# Patient Record
Sex: MALE | Race: WHITE | NOT HISPANIC OR LATINO | Employment: FULL TIME | URBAN - METROPOLITAN AREA
[De-identification: names, ages, dates, MRNs, and addresses within clinical notes are randomized per-mention and may not be internally consistent; named-entity substitution may affect disease eponyms.]

---

## 2020-10-29 ENCOUNTER — APPOINTMENT (OUTPATIENT)
Dept: WOMENS IMAGING | Facility: HOSPITAL | Age: 60
End: 2020-10-29

## 2020-10-29 ENCOUNTER — OFFICE VISIT (OUTPATIENT)
Dept: INTERNAL MEDICINE | Facility: CLINIC | Age: 60
End: 2020-10-29

## 2020-10-29 VITALS
HEIGHT: 60 IN | SYSTOLIC BLOOD PRESSURE: 134 MMHG | RESPIRATION RATE: 16 BRPM | BODY MASS INDEX: 32.08 KG/M2 | OXYGEN SATURATION: 96 % | DIASTOLIC BLOOD PRESSURE: 89 MMHG | WEIGHT: 163.4 LBS | HEART RATE: 76 BPM | TEMPERATURE: 97.9 F

## 2020-10-29 DIAGNOSIS — M25.512 CHRONIC LEFT SHOULDER PAIN: ICD-10-CM

## 2020-10-29 DIAGNOSIS — Z13.220 ENCOUNTER FOR LIPID SCREENING FOR CARDIOVASCULAR DISEASE: ICD-10-CM

## 2020-10-29 DIAGNOSIS — L98.9 LEG SKIN LESION, LEFT: ICD-10-CM

## 2020-10-29 DIAGNOSIS — G89.29 CHRONIC LEFT SHOULDER PAIN: ICD-10-CM

## 2020-10-29 DIAGNOSIS — Z13.6 ENCOUNTER FOR LIPID SCREENING FOR CARDIOVASCULAR DISEASE: ICD-10-CM

## 2020-10-29 DIAGNOSIS — Z11.59 ENCOUNTER FOR HCV SCREENING TEST FOR LOW RISK PATIENT: ICD-10-CM

## 2020-10-29 DIAGNOSIS — Z23 NEEDS FLU SHOT: ICD-10-CM

## 2020-10-29 DIAGNOSIS — K21.9 GASTROESOPHAGEAL REFLUX DISEASE WITHOUT ESOPHAGITIS: ICD-10-CM

## 2020-10-29 DIAGNOSIS — Z12.11 COLON CANCER SCREENING: ICD-10-CM

## 2020-10-29 DIAGNOSIS — Z76.89 ENCOUNTER TO ESTABLISH CARE: Primary | ICD-10-CM

## 2020-10-29 PROCEDURE — 90686 IIV4 VACC NO PRSV 0.5 ML IM: CPT | Performed by: NURSE PRACTITIONER

## 2020-10-29 PROCEDURE — 90471 IMMUNIZATION ADMIN: CPT | Performed by: NURSE PRACTITIONER

## 2020-10-29 PROCEDURE — 73030 X-RAY EXAM OF SHOULDER: CPT | Performed by: NURSE PRACTITIONER

## 2020-10-29 PROCEDURE — 73030 X-RAY EXAM OF SHOULDER: CPT | Performed by: RADIOLOGY

## 2020-10-29 PROCEDURE — 99203 OFFICE O/P NEW LOW 30 MIN: CPT | Performed by: NURSE PRACTITIONER

## 2020-10-29 RX ORDER — OMEPRAZOLE 20 MG/1
20 CAPSULE, DELAYED RELEASE ORAL DAILY
Qty: 30 CAPSULE | Refills: 5 | Status: SHIPPED | OUTPATIENT
Start: 2020-10-29 | End: 2022-10-31

## 2020-10-30 LAB
ALBUMIN SERPL-MCNC: 4.6 G/DL (ref 3.5–5.2)
ALBUMIN/GLOB SERPL: 1.8 G/DL
ALP SERPL-CCNC: 80 U/L (ref 39–117)
ALT SERPL-CCNC: 22 U/L (ref 1–41)
AST SERPL-CCNC: 19 U/L (ref 1–40)
BILIRUB SERPL-MCNC: 0.5 MG/DL (ref 0–1.2)
BUN SERPL-MCNC: 15 MG/DL (ref 8–23)
BUN/CREAT SERPL: 17.2 (ref 7–25)
CALCIUM SERPL-MCNC: 9.4 MG/DL (ref 8.6–10.5)
CHLORIDE SERPL-SCNC: 102 MMOL/L (ref 98–107)
CHOLEST SERPL-MCNC: 187 MG/DL (ref 0–200)
CO2 SERPL-SCNC: 22.9 MMOL/L (ref 22–29)
CREAT SERPL-MCNC: 0.87 MG/DL (ref 0.76–1.27)
ERYTHROCYTE [DISTWIDTH] IN BLOOD BY AUTOMATED COUNT: 12 % (ref 12.3–15.4)
GLOBULIN SER CALC-MCNC: 2.5 GM/DL
GLUCOSE SERPL-MCNC: 100 MG/DL (ref 65–99)
HCT VFR BLD AUTO: 47.9 % (ref 37.5–51)
HCV AB S/CO SERPL IA: <0.1 S/CO RATIO (ref 0–0.9)
HDLC SERPL-MCNC: 56 MG/DL (ref 40–60)
HGB BLD-MCNC: 16.1 G/DL (ref 13–17.7)
LABORATORY COMMENT REPORT: NORMAL
LDLC SERPL CALC-MCNC: 119 MG/DL (ref 0–100)
LDLC/HDLC SERPL: 2.11 {RATIO}
MCH RBC QN AUTO: 30.8 PG (ref 26.6–33)
MCHC RBC AUTO-ENTMCNC: 33.6 G/DL (ref 31.5–35.7)
MCV RBC AUTO: 91.8 FL (ref 79–97)
PLATELET # BLD AUTO: 336 10*3/MM3 (ref 140–450)
POTASSIUM SERPL-SCNC: 4.8 MMOL/L (ref 3.5–5.2)
PROT SERPL-MCNC: 7.1 G/DL (ref 6–8.5)
RBC # BLD AUTO: 5.22 10*6/MM3 (ref 4.14–5.8)
SODIUM SERPL-SCNC: 138 MMOL/L (ref 136–145)
TRIGL SERPL-MCNC: 65 MG/DL (ref 0–150)
VLDLC SERPL CALC-MCNC: 12 MG/DL (ref 5–40)
WBC # BLD AUTO: 6.42 10*3/MM3 (ref 3.4–10.8)

## 2020-11-02 DIAGNOSIS — M25.512 CHRONIC LEFT SHOULDER PAIN: Primary | ICD-10-CM

## 2020-11-02 DIAGNOSIS — G89.29 CHRONIC LEFT SHOULDER PAIN: Primary | ICD-10-CM

## 2020-11-03 NOTE — PROGRESS NOTES
Please call and notify him that his lab work has been reviewed and it looks relatively normal.  His left shoulder shows severe degenerative disease.  I will put a referral for him to go to Ortho for evaluation.     JEAN-PAUL

## 2020-11-16 ENCOUNTER — OFFICE VISIT (OUTPATIENT)
Dept: ORTHOPEDIC SURGERY | Facility: CLINIC | Age: 60
End: 2020-11-16

## 2020-11-16 VITALS — WEIGHT: 145 LBS | TEMPERATURE: 97.6 F | BODY MASS INDEX: 24.16 KG/M2 | HEIGHT: 65 IN

## 2020-11-16 DIAGNOSIS — M19.019 SHOULDER ARTHRITIS: ICD-10-CM

## 2020-11-16 DIAGNOSIS — R52 PAIN: Primary | ICD-10-CM

## 2020-11-16 PROCEDURE — 99204 OFFICE O/P NEW MOD 45 MIN: CPT | Performed by: ORTHOPAEDIC SURGERY

## 2020-11-16 PROCEDURE — 73030 X-RAY EXAM OF SHOULDER: CPT | Performed by: ORTHOPAEDIC SURGERY

## 2020-11-16 RX ORDER — ACETAMINOPHEN 325 MG/1
1000 TABLET ORAL ONCE
Status: CANCELLED | OUTPATIENT
Start: 2020-12-17 | End: 2020-11-16

## 2020-11-16 RX ORDER — PREGABALIN 75 MG/1
150 CAPSULE ORAL ONCE
Status: CANCELLED | OUTPATIENT
Start: 2020-12-17 | End: 2020-11-16

## 2020-11-16 RX ORDER — VANCOMYCIN HYDROCHLORIDE 1 G/200ML
15 INJECTION, SOLUTION INTRAVENOUS ONCE
Status: CANCELLED | OUTPATIENT
Start: 2020-12-17 | End: 2020-11-16

## 2020-11-16 RX ORDER — MELOXICAM 15 MG/1
15 TABLET ORAL ONCE
Status: CANCELLED | OUTPATIENT
Start: 2020-12-17 | End: 2020-11-16

## 2020-11-16 RX ORDER — CEFAZOLIN SODIUM 2 G/100ML
2 INJECTION, SOLUTION INTRAVENOUS ONCE
Status: CANCELLED | OUTPATIENT
Start: 2020-12-17 | End: 2020-11-16

## 2020-11-16 NOTE — PROGRESS NOTES
"Patient: Ashwin Anand    YOB: 1960    Medical Record Number: [id number]    Chief Complaints:  Left shoulder pain    History of Present Illness:     60 y.o. male patient who presents with a complaint of left shoulder pain.  He reports that the symptoms first started 10 to 15 years ago.  He has had a number of falls over the years, many of which occurred at work, but he does not recall a specific injury or inciting factor.  Pain is severe, constant and both aching and grinding.  No shooting pain down the arm, weakness or numbness.      He reports difficulty with routine daily activities at this point.  The symptoms have been getting progressively worse.  He denies any significant alleviating factors.  He has tried over-the-counter anti-inflammatories and has been modifying his activities as best as possible.  These have helped only modestly.  Again, he just feels like things are getting worse and this has become very debilitating for him.  He is left-hand dominant and works as an .  His job has become difficult for him due to his ongoing shoulder problems.    Allergies:   Allergies   Allergen Reactions   • Haldol [Haloperidol] Other (See Comments)     It makes me go \"schoo\"       Home Medications:    Current Outpatient Medications:   •  omeprazole (PrilOSEC) 20 MG capsule, Take 1 capsule by mouth Daily., Disp: 30 capsule, Rfl: 5    Past Medical History:   Diagnosis Date   • COPD (chronic obstructive pulmonary disease) (CMS/HCC)    • Schizophrenia, schizo-affective (CMS/HCC)        Past Surgical History:   Procedure Laterality Date   • APPENDECTOMY     • BACK SURGERY     • FINGER SURGERY     • KNEE SURGERY     • NECK SURGERY         Social History     Occupational History   • Not on file   Tobacco Use   • Smoking status: Former Smoker     Types: Cigarettes   • Smokeless tobacco: Never Used   • Tobacco comment: Feb 2008    Substance and Sexual Activity   • Alcohol use: Yes     Drinks " "per session: 1 or 2   • Drug use: Not on file   • Sexual activity: Not on file      Social History     Social History Narrative   • Not on file   As above.    Family History   Problem Relation Age of Onset   • Diabetes Mother    • Diabetes Father    • Prostate cancer Maternal Uncle        Review of Systems:      Constitutional: Denies fever, shaking or chills   Eyes: Denies change in visual acuity   HEENT: Denies nasal congestion or sore throat   Respiratory: Denies cough or shortness of breath   Cardiovascular: Denies chest pain or edema  Endocrine: Denies tremors, palpitations, intolerance of heat or cold, polyuria, polydipsia.  GI: Denies abdominal pain, nausea, vomiting, bloody stools or diarrhea  : Denies frequency, urgency, incontinence, retention, or nocturia.  Musculoskeletal: Denies numbness, tingling or loss of motor function   Integument: Denies rash, lesion or ulceration   Neurologic: Denies headache or focal weakness, deficits  Heme: Denies spontaneous or excessive bleeding, epistaxis, hematuria, melena, fatigue, enlarged or tender lymph nodes.      All other pertinent positives and negatives as noted above in HPI.    Physical Exam:   60 y.o. male  Vitals:    11/16/20 0915   Temp: 97.6 °F (36.4 °C)   TempSrc: Temporal   Weight: 65.8 kg (145 lb)   Height: 165.1 cm (65\")       General:  Patient is awake and alert.  Appears in no acute distress or discomfort.    Psych:  Affect and demeanor are appropriate.    Eyes:  Conjunctiva and sclera appear grossly normal.  Eyes track well and EOM seem to be intact.    Ears:  No gross abnormalities.  Hearing adequate for the exam.    Cardiovascular:  Regular rate and rhythm.    Lungs:  Good chest expansion.  Breathing unlabored.    Spine:  Neck appears grossly normal.  No palpable masses or adenopathy.  Good motion.  Spurling's maneuver is negative for any shoulder or arm symptoms.    Extremities:  Right shoulder is examined.  Skin is benign.  No obvious gross " abnormalities.  No palpable masses or adenopathy.  Moderate tenderness noted over anterior glenohumeral joint and rotator interval.  Motion is to roughly 50 degrees of forward elevation, degrees of external rotation, lacks at least 20 degrees of horizontal abduction, side pocket internal rotation.  He has audible and palpable crepitus with range of motion.  No instability.  Rotator cuff strength seems to be well preserved but the exam is limited due to discomfort with resisted active motion.  His deltoid will fire but I could not really get a good assessment of his strength.  Axillary nerve sensation is intact.  Good motor and sensory function in his lower arm and hand.  Palpable radial pulse.  Brisk capillary refill.         Radiology:  AP, scapular Y, and axillary views of the left shoulder are ordered by myself and reviewed to evaluate the patient's complaint.  No comparison films are immediately available.  The x-rays show end-stage glenohumeral osteoarthritis with bone-on-bone degeneration, severe glenoid wear, osteophyte formation, and subchondral sclerosis.  The acromiohumeral interval measures normal.    Assessment/Plan:  Left shoulder end-stage osteoarthritis    We discussed treatment options in detail including conservative treatment versus surgical options.  Regarding conservative treatment, we discussed appropriate activity modifications, anti-inflammatories, injections, and physical therapy.  We also discussed the option of an arthroplasty and all that would entail.  He would like to move forward with the replacement.  Despite never really having tried any formal conservative therapy, I consider that his arthrosis is severe enough to warrant an arthroplasty at this point.  He would like to move forward with that.  The risk, benefits and alternatives were thoroughly discussed.  We are going to look at getting this scheduled for him.  He will follow-up with us for a preoperative visit with either myself  or Kirsten.    Ashutosh Mehta MD    11/16/2020    CC to Sophie Rascon III, NP-C

## 2020-12-04 PROBLEM — M19.019 SHOULDER ARTHRITIS: Status: ACTIVE | Noted: 2020-12-04

## 2020-12-14 ENCOUNTER — OFFICE VISIT (OUTPATIENT)
Dept: ORTHOPEDIC SURGERY | Facility: CLINIC | Age: 60
End: 2020-12-14

## 2020-12-14 VITALS — WEIGHT: 161 LBS | BODY MASS INDEX: 26.82 KG/M2 | HEIGHT: 65 IN | TEMPERATURE: 96 F

## 2020-12-14 DIAGNOSIS — Z01.818 PREOP EXAMINATION: Primary | ICD-10-CM

## 2020-12-14 PROCEDURE — 99214 OFFICE O/P EST MOD 30 MIN: CPT | Performed by: NURSE PRACTITIONER

## 2020-12-14 NOTE — H&P (VIEW-ONLY)
"   History & Physical         Patient: Ashwin Anand    YOB: 1960    Medical Record Number: [id number]    Chief Complaints:   Chief Complaint   Patient presents with   • Left Shoulder - Pre-op Exam       History of Present Illness: 60 y.o. male comes in today of upcoming shoulder replacement surgery. Reports a long history of progressively worsening pain, motion loss, and dysfunction.  Describes current pain as severe.  Has failed prolonged conservative treatment.  Denies any new complaints or issues.    Allergies:   Allergies   Allergen Reactions   • Haldol [Haloperidol] Other (See Comments)     It makes me go \"schoo\"       Medications:   Home Medications:    Current Outpatient Medications:   •  omeprazole (PrilOSEC) 20 MG capsule, Take 1 capsule by mouth Daily., Disp: 30 capsule, Rfl: 5    Past Medical History:   Diagnosis Date   • COPD (chronic obstructive pulmonary disease) (CMS/HCC)    • Schizophrenia, schizo-affective (CMS/HCC)           Past Surgical History:   Procedure Laterality Date   • APPENDECTOMY     • BACK SURGERY     • FINGER SURGERY     • KNEE SURGERY     • NECK SURGERY            Social History     Occupational History   • Not on file   Tobacco Use   • Smoking status: Former Smoker     Types: Cigarettes   • Smokeless tobacco: Never Used   • Tobacco comment: Feb 2008    Substance and Sexual Activity   • Alcohol use: Yes     Drinks per session: 1 or 2   • Drug use: Not on file   • Sexual activity: Not on file      Social History     Social History Narrative   • Not on file          Family History   Problem Relation Age of Onset   • Diabetes Mother    • Diabetes Father    • Prostate cancer Maternal Uncle        Review of Systems:     Constitutional:  Denies fever, shaking or chills   Eyes:  Denies change in visual acuity   HEENT:  Denies nasal congestion or sore throat   Respiratory:  Denies cough or shortness of breath   Cardiovascular:  Denies chest pain or edema   GI:  Denies abdominal " "pain, nausea, vomiting, bloody stools or diarrhea   Musculoskeletal:  Denies numbness tingling or loss of motor function except as outlined above in history of present illness.  Integument:  Denies rash, lesion or ulceration   Neurologic:  Denies headache or focal weakness, deficits      All other pertinent positives and negatives as noted above in HPI.    Physical Exam: 60 y.o. male    Vitals:    12/14/20 1005   Temp: 96 °F (35.6 °C)   TempSrc: Temporal   Weight: 73 kg (161 lb)   Height: 165.1 cm (65\")     General:  Patient is awake and alert.  Appears in no acute distress or discomfort.    Psych:  Affect and demeanor are appropriate.    Eyes:  Conjunctiva and sclera appear grossly normal.  Eyes track well and EOM seem to be intact.    Ears:  No gross abnormalities.  Hearing adequate for the exam.    Cardiovascular:  Regular rate and rhythm.    Lungs:  Good chest expansion.  Breathing unlabored.    Lymph:  No palpable adenopathy about neck or axilla.    Neck:  Supple.  Normal ROM.  Negative Spurling's for shoulder or arm pain.    Left upper extremity:  Skin benign and intact without evidence for swelling, masses or atrophy.  No palpable masses.  Moderate anterior tenderness.  Shoulder ROM limited and uncomfortable--45° FE, 35° ER, to back pocket IR.  No evident instability.  He has audible and palpable crepitus with range of motion.  Unable to assess weakness due to discomfort.  He can briefly maintain his arm in forward elevation when I raise it for him.  Good strength in the lower arm and hand.  Intact sensation throughout the arm and hand palpable radial pulse with brisk cap refill.    Diagnostic Tests:  Lab Results   Component Value Date    CALCIUM 9.4 10/29/2020     10/29/2020    K 4.8 10/29/2020    CO2 22.9 10/29/2020     10/29/2020    BUN 15 10/29/2020    CREATININE 0.87 10/29/2020    EGFRIFAFRI 108 10/29/2020    EGFRIFNONA 89 10/29/2020    BCR 17.2 10/29/2020     Lab Results   Component Value " Date    WBC 6.42 10/29/2020    HGB 16.1 10/29/2020    HCT 47.9 10/29/2020    MCV 91.8 10/29/2020     10/29/2020     No results found for: INR, PROTIME    Imaging:  Previous x-rays of the shoulder are reviewed.  The x-rays show end-stage glenohumeral osteoarthritis with bone-on-bone degeneration, severe glenoid wear, osteophyte formation, and subchondral sclerosis.  The acromiohumeral interval measures normal.    Assessment:  Left shoulder end-stage osteoarthritis    Plan: We had a thorough discussion regarding the risks, benefits and alternatives to an arthroplasty and non-surgical management versus surgery.  I explained that surgical risks include infection, hematoma, hardware related complications including failure of fixation, loosening, fracture, persistent pain and/or loss of motion, iatrogenic nerve and/or blood vessel injury resulting in permanent weakness, numbness or dysfunction, DVT, PE, positioning related neuropraxia, and anesthesia related complications resulting in death.  We discussed the indication for a reverse as opposed to a standard total shoulder and the risks inherent to the reverse including notching, glenoid loosening, instability, and traction related neuropraxia, any of which could result in persistent pain or problems requiring further surgery.  Lastly, we discussed the rehab and all that will be expected of the patient post operatively to ensure an optimal outcome.  The patient voiced understanding of the risks, benefits, and alternative forms of treatment that were discussed and the patient consents to proceed with the reverse arthroplasty.      Kirsten Perez, MAILE  12/14/20

## 2020-12-14 NOTE — PROGRESS NOTES
"   History & Physical         Patient: Ashwin Anand    YOB: 1960    Medical Record Number: [id number]    Chief Complaints:   Chief Complaint   Patient presents with   • Left Shoulder - Pre-op Exam       History of Present Illness: 60 y.o. male comes in today of upcoming shoulder replacement surgery. Reports a long history of progressively worsening pain, motion loss, and dysfunction.  Describes current pain as severe.  Has failed prolonged conservative treatment.  Denies any new complaints or issues.    Allergies:   Allergies   Allergen Reactions   • Haldol [Haloperidol] Other (See Comments)     It makes me go \"schoo\"       Medications:   Home Medications:    Current Outpatient Medications:   •  omeprazole (PrilOSEC) 20 MG capsule, Take 1 capsule by mouth Daily., Disp: 30 capsule, Rfl: 5    Past Medical History:   Diagnosis Date   • COPD (chronic obstructive pulmonary disease) (CMS/HCC)    • Schizophrenia, schizo-affective (CMS/HCC)           Past Surgical History:   Procedure Laterality Date   • APPENDECTOMY     • BACK SURGERY     • FINGER SURGERY     • KNEE SURGERY     • NECK SURGERY            Social History     Occupational History   • Not on file   Tobacco Use   • Smoking status: Former Smoker     Types: Cigarettes   • Smokeless tobacco: Never Used   • Tobacco comment: Feb 2008    Substance and Sexual Activity   • Alcohol use: Yes     Drinks per session: 1 or 2   • Drug use: Not on file   • Sexual activity: Not on file      Social History     Social History Narrative   • Not on file          Family History   Problem Relation Age of Onset   • Diabetes Mother    • Diabetes Father    • Prostate cancer Maternal Uncle        Review of Systems:     Constitutional:  Denies fever, shaking or chills   Eyes:  Denies change in visual acuity   HEENT:  Denies nasal congestion or sore throat   Respiratory:  Denies cough or shortness of breath   Cardiovascular:  Denies chest pain or edema   GI:  Denies abdominal " "pain, nausea, vomiting, bloody stools or diarrhea   Musculoskeletal:  Denies numbness tingling or loss of motor function except as outlined above in history of present illness.  Integument:  Denies rash, lesion or ulceration   Neurologic:  Denies headache or focal weakness, deficits      All other pertinent positives and negatives as noted above in HPI.    Physical Exam: 60 y.o. male    Vitals:    12/14/20 1005   Temp: 96 °F (35.6 °C)   TempSrc: Temporal   Weight: 73 kg (161 lb)   Height: 165.1 cm (65\")     General:  Patient is awake and alert.  Appears in no acute distress or discomfort.    Psych:  Affect and demeanor are appropriate.    Eyes:  Conjunctiva and sclera appear grossly normal.  Eyes track well and EOM seem to be intact.    Ears:  No gross abnormalities.  Hearing adequate for the exam.    Cardiovascular:  Regular rate and rhythm.    Lungs:  Good chest expansion.  Breathing unlabored.    Lymph:  No palpable adenopathy about neck or axilla.    Neck:  Supple.  Normal ROM.  Negative Spurling's for shoulder or arm pain.    Left upper extremity:  Skin benign and intact without evidence for swelling, masses or atrophy.  No palpable masses.  Moderate anterior tenderness.  Shoulder ROM limited and uncomfortable--45° FE, 35° ER, to back pocket IR.  No evident instability.  He has audible and palpable crepitus with range of motion.  Unable to assess weakness due to discomfort.  He can briefly maintain his arm in forward elevation when I raise it for him.  Good strength in the lower arm and hand.  Intact sensation throughout the arm and hand palpable radial pulse with brisk cap refill.    Diagnostic Tests:  Lab Results   Component Value Date    CALCIUM 9.4 10/29/2020     10/29/2020    K 4.8 10/29/2020    CO2 22.9 10/29/2020     10/29/2020    BUN 15 10/29/2020    CREATININE 0.87 10/29/2020    EGFRIFAFRI 108 10/29/2020    EGFRIFNONA 89 10/29/2020    BCR 17.2 10/29/2020     Lab Results   Component Value " Date    WBC 6.42 10/29/2020    HGB 16.1 10/29/2020    HCT 47.9 10/29/2020    MCV 91.8 10/29/2020     10/29/2020     No results found for: INR, PROTIME    Imaging:  Previous x-rays of the shoulder are reviewed.  The x-rays show end-stage glenohumeral osteoarthritis with bone-on-bone degeneration, severe glenoid wear, osteophyte formation, and subchondral sclerosis.  The acromiohumeral interval measures normal.    Assessment:  Left shoulder end-stage osteoarthritis    Plan: We had a thorough discussion regarding the risks, benefits and alternatives to an arthroplasty and non-surgical management versus surgery.  I explained that surgical risks include infection, hematoma, hardware related complications including failure of fixation, loosening, fracture, persistent pain and/or loss of motion, iatrogenic nerve and/or blood vessel injury resulting in permanent weakness, numbness or dysfunction, DVT, PE, positioning related neuropraxia, and anesthesia related complications resulting in death.  We discussed the indication for a reverse as opposed to a standard total shoulder and the risks inherent to the reverse including notching, glenoid loosening, instability, and traction related neuropraxia, any of which could result in persistent pain or problems requiring further surgery.  Lastly, we discussed the rehab and all that will be expected of the patient post operatively to ensure an optimal outcome.  The patient voiced understanding of the risks, benefits, and alternative forms of treatment that were discussed and the patient consents to proceed with the reverse arthroplasty.      Kirsten Perez, MAILE  12/14/20

## 2020-12-15 ENCOUNTER — APPOINTMENT (OUTPATIENT)
Dept: PREADMISSION TESTING | Facility: HOSPITAL | Age: 60
End: 2020-12-15

## 2020-12-15 VITALS
HEART RATE: 91 BPM | DIASTOLIC BLOOD PRESSURE: 87 MMHG | TEMPERATURE: 97.9 F | HEIGHT: 65 IN | BODY MASS INDEX: 25.83 KG/M2 | RESPIRATION RATE: 18 BRPM | OXYGEN SATURATION: 98 % | WEIGHT: 155 LBS | SYSTOLIC BLOOD PRESSURE: 147 MMHG

## 2020-12-15 DIAGNOSIS — M19.019 SHOULDER ARTHRITIS: ICD-10-CM

## 2020-12-15 LAB
ANION GAP SERPL CALCULATED.3IONS-SCNC: 11.8 MMOL/L (ref 5–15)
BUN SERPL-MCNC: 18 MG/DL (ref 8–23)
BUN/CREAT SERPL: 20.2 (ref 7–25)
CALCIUM SPEC-SCNC: 9.1 MG/DL (ref 8.6–10.5)
CHLORIDE SERPL-SCNC: 102 MMOL/L (ref 98–107)
CO2 SERPL-SCNC: 24.2 MMOL/L (ref 22–29)
CREAT SERPL-MCNC: 0.89 MG/DL (ref 0.76–1.27)
DEPRECATED RDW RBC AUTO: 38.9 FL (ref 37–54)
ERYTHROCYTE [DISTWIDTH] IN BLOOD BY AUTOMATED COUNT: 12 % (ref 12.3–15.4)
GFR SERPL CREATININE-BSD FRML MDRD: 87 ML/MIN/1.73
GLUCOSE SERPL-MCNC: 91 MG/DL (ref 65–99)
HCT VFR BLD AUTO: 44.4 % (ref 37.5–51)
HGB BLD-MCNC: 15.5 G/DL (ref 13–17.7)
MCH RBC QN AUTO: 31.3 PG (ref 26.6–33)
MCHC RBC AUTO-ENTMCNC: 34.9 G/DL (ref 31.5–35.7)
MCV RBC AUTO: 89.5 FL (ref 79–97)
PLATELET # BLD AUTO: 338 10*3/MM3 (ref 140–450)
PMV BLD AUTO: 9.4 FL (ref 6–12)
POTASSIUM SERPL-SCNC: 3.9 MMOL/L (ref 3.5–5.2)
QT INTERVAL: 361 MS
RBC # BLD AUTO: 4.96 10*6/MM3 (ref 4.14–5.8)
SODIUM SERPL-SCNC: 138 MMOL/L (ref 136–145)
WBC # BLD AUTO: 8.8 10*3/MM3 (ref 3.4–10.8)

## 2020-12-15 PROCEDURE — 93010 ELECTROCARDIOGRAM REPORT: CPT | Performed by: INTERNAL MEDICINE

## 2020-12-15 PROCEDURE — 36415 COLL VENOUS BLD VENIPUNCTURE: CPT

## 2020-12-15 PROCEDURE — 85027 COMPLETE CBC AUTOMATED: CPT

## 2020-12-15 PROCEDURE — 80048 BASIC METABOLIC PNL TOTAL CA: CPT

## 2020-12-15 PROCEDURE — U0004 COV-19 TEST NON-CDC HGH THRU: HCPCS | Performed by: NURSE PRACTITIONER

## 2020-12-15 PROCEDURE — C9803 HOPD COVID-19 SPEC COLLECT: HCPCS | Performed by: NURSE PRACTITIONER

## 2020-12-15 PROCEDURE — 93005 ELECTROCARDIOGRAM TRACING: CPT

## 2020-12-15 NOTE — DISCHARGE INSTRUCTIONS
Take the following medications the morning of surgery:  OMEPRAZOLE    If you are on prescription narcotic pain medication to control your pain you may also take that medication the morning of surgery.    General Instructions:  • Do not eat solid food after midnight the night before surgery.  • You may drink clear liquids day of surgery but must stop at least one hour before your hospital arrival time.  • It is beneficial for you to have a clear drink that contains carbohydrates the day of surgery.  We suggest a 12 to 20 ounce bottle of Gatorade or Powerade for non-diabetic patients or a 12 to 20 ounce bottle of G2 or Powerade Zero for diabetic patients. (Pediatric patients, are not advised to drink a 12 to 20 ounce carbohydrate drink)    Clear liquids are liquids you can see through.  Nothing red in color.     Plain water                               Sports drinks  Sodas                                   Gelatin (Jell-O)  Fruit juices without pulp such as white grape juice and apple juice  Popsicles that contain no fruit or yogurt  Tea or coffee (no cream or milk added)  Gatorade / Powerade  G2 / Powerade Zero    • Infants may have breast milk up to four hours before surgery.  • Infants drinking formula may drink formula up to six hours before surgery.   • Patients who avoid smoking, chewing tobacco and alcohol for 4 weeks prior to surgery have a reduced risk of post-operative complications.  Quit smoking as many days before surgery as you can.  • Do not smoke, use chewing tobacco or drink alcohol the day of surgery.   • If applicable bring your C-PAP/ BI-PAP machine.  • Bring any papers given to you in the doctor’s office.  • Wear clean comfortable clothes.  • Do not wear contact lenses, false eyelashes or make-up.  Bring a case for your glasses.   • Bring crutches or walker if applicable.  • Remove all piercings.  Leave jewelry and any other valuables at home.  • Hair extensions with metal clips must be removed  prior to surgery.  • The Pre-Admission Testing nurse will instruct you to bring medications if unable to obtain an accurate list in Pre-Admission Testing.        If you were given a blood bank ID arm band remember to bring it with you the day of surgery.    Preventing a Surgical Site Infection:  • For 2 to 3 days before surgery, avoid shaving with a razor because the razor can irritate skin and make it easier to develop an infection.    • Any areas of open skin can increase the risk of a post-operative wound infection by allowing bacteria to enter and travel throughout the body.  Notify your surgeon if you have any skin wounds / rashes even if it is not near the expected surgical site.  The area will need assessed to determine if surgery should be delayed until it is healed.  • The night prior to surgery shower using a fresh bar of anti-bacterial soap (such as Dial) and clean washcloth.  Sleep in a clean bed with clean clothing.  Do not allow pets to sleep with you.  • Shower on the morning of surgery using a fresh bar of anti-bacterial soap (such as Dial) and clean washcloth.  Dry with a clean towel and dress in clean clothing.  • Ask your surgeon if you will be receiving antibiotics prior to surgery.  • Make sure you, your family, and all healthcare providers clean their hands with soap and water or an alcohol based hand  before caring for you or your wound.    Day of surgery: 12/17/2020 PT IS GOING TO CALL DR BORAJ OFFICE AND CONFIRM ARRIVAL TIME  Your arrival time is approximately two hours before your scheduled surgery time.  Upon arrival, a Pre-op nurse and Anesthesiologist will review your health history, obtain vital signs, and answer questions you may have.  The only belongings needed at this time will be a list of your home medications and if applicable your C-PAP/BI-PAP machine.  A Pre-op nurse will start an IV and you may receive medication in preparation for surgery, including something to  help you relax.     Please be aware that surgery does come with discomfort.  We want to make every effort to control your discomfort so please discuss any uncontrolled symptoms with your nurse.   Your doctor will most likely have prescribed pain medications.      If you are going home after surgery you will receive individualized written care instructions before being discharged.  A responsible adult must drive you to and from the hospital on the day of your surgery and stay with you for 24 hours.    If you are staying overnight following surgery, you will be transported to your hospital room following the recovery period.  [facility] has [facility] rooms.    If you have any questions please call Pre-Admission Testing at [phone].  Deductibles and co-payments are collected on the day of service. Please be prepared to pay the required co-pay, deductible or deposit on the day of service as defined by your plan.    Patient Education for Self-Quarantine Process    Following your COVID testing, we strongly recommend that you do not leave your home after you have been tested for COVID except to get medical care. This includes not going to work, school or to public areas.  If this is not possible for you to do please limit your activities to only required outings.  Be sure to wear a mask when you are with other people, practice social distancing and wash your hands frequently.      The following items provide additional details to keep you safe.  • Wash your hands with soap and water frequently for at least 20 seconds.   • Avoid touching your eyes, nose and mouth with unwashed hands.  • Do not share anything - utensils, towels, food from the same bowl.   • Have your own utensils, drinking glass, dishes, towels and bedding.   • Do not have visitors.   • Do use FaceTime to stay in touch with family and friends.  • You should stay in a specific room away from others if possible.   • Stay at least 6 feet  away from others in the home if you cannot have a dedicated room to yourself.   • Do not snuggle with your pet. While the CDC says there is no evidence that pets can spread COVID-19 or be infected from humans, it is probably best to avoid “petting, snuggling, being kissed or licked and sharing food (during self-quarantine)”, according to the CDC.   • Sanitize household surfaces daily. Include all high touch areas (door handles, light switches, phones, countertops, etc.)  • Do not share a bathroom with others, if possible.   • Wear a mask around others in your home if you are unable to stay in a separate room or 6 feet apart. If  you are unable to wear a mask, have your family member wear a mask if they must be within 6 feet of you.   Call your surgeon immediately if you experience any of the following symptoms:  • Sore Throat  • Shortness of Breath or difficulty breathing  • Cough  • Chills  • Body soreness or muscle pain  • Headache  • Fever  • New loss of taste or smell  • Do not arrive for your surgery ill.  Your procedure will need to be rescheduled to another time.  You will need to call your physician before the day of surgery to avoid any unnecessary exposure to hospital staff as well as other patients.    CHLORHEXIDINE CLOTH INSTRUCTIONS  The morning of surgery follow these instructions using the Chlorhexidine cloths you've been given.  These steps reduce bacteria on the body.  Do not use the cloths near your eyes, ears mouth, genitalia or on open wounds.  Throw the cloths away after use but do not try to flush them down a toilet.      • Open and remove one cloth at a time from the package.    • Leave the cloth unfolded and begin the bathing.  • Massage the skin with the cloths using gentle pressure to remove bacteria.  Do not scrub harshly.   • Follow the steps below with one 2% CHG cloth per area (6 total cloths).  • One cloth for neck, shoulders and chest.  • One cloth for both arms, hands, fingers and  underarms (do underarms last).  • One cloth for the abdomen followed by groin.  • One cloth for right leg and foot including between the toes.  • One cloth for left leg and foot including between the toes.  • The last cloth is to be used for the back of the neck, back and buttocks.    Allow the CHG to air dry 3 minutes on the skin which will give it time to work and decrease the chance of irritation.  The skin may feel sticky until it is dry.  Do not rinse with water or any other liquid or you will lose the beneficial effects of the CHG.  If mild skin irritation occurs, do rinse the skin to remove the CHG.  Report this to the nurse at time of admission.  Do not apply lotions, creams, ointments, deodorants or perfumes after using the clothes. Dress in clean clothes before coming to the hospital.    BACTROBAN NASAL OINTMENT  There are many germs normally in your nose. Bactroban is an ointment that will help reduce these germs. Please follow these instructions for Bactroban use:      ____The day before surgery in the morning  Date________    ____The day before surgery in the evening              Date________    ____The day of surgery in the morning    Date________    **Squirt ½ package of Bactroban Ointment onto a cotton applicator and apply to inside of 1st nostril.  Squirt the remaining Bactroban and apply to the inside of the other nostril.

## 2020-12-16 LAB — SARS-COV-2 RNA RESP QL NAA+PROBE: NOT DETECTED

## 2020-12-17 ENCOUNTER — HOSPITAL ENCOUNTER (INPATIENT)
Facility: HOSPITAL | Age: 60
LOS: 1 days | Discharge: HOME | End: 2020-12-18
Attending: ORTHOPAEDIC SURGERY | Admitting: ORTHOPAEDIC SURGERY

## 2020-12-17 ENCOUNTER — ANESTHESIA (OUTPATIENT)
Dept: PERIOP | Facility: HOSPITAL | Age: 60
End: 2020-12-17

## 2020-12-17 ENCOUNTER — APPOINTMENT (OUTPATIENT)
Dept: GENERAL RADIOLOGY | Facility: HOSPITAL | Age: 60
End: 2020-12-17

## 2020-12-17 ENCOUNTER — ANESTHESIA EVENT (OUTPATIENT)
Dept: PERIOP | Facility: HOSPITAL | Age: 60
End: 2020-12-17

## 2020-12-17 DIAGNOSIS — M19.019 SHOULDER ARTHRITIS: ICD-10-CM

## 2020-12-17 DIAGNOSIS — Z96.612 S/P REVERSE TOTAL SHOULDER ARTHROPLASTY, LEFT: Primary | ICD-10-CM

## 2020-12-17 PROCEDURE — 25010000002 NEOSTIGMINE PER 0.5 MG: Performed by: NURSE ANESTHETIST, CERTIFIED REGISTERED

## 2020-12-17 PROCEDURE — 25010000002 DEXAMETHASONE PER 1 MG: Performed by: NURSE ANESTHETIST, CERTIFIED REGISTERED

## 2020-12-17 PROCEDURE — 76942 ECHO GUIDE FOR BIOPSY: CPT | Performed by: ORTHOPAEDIC SURGERY

## 2020-12-17 PROCEDURE — C1776 JOINT DEVICE (IMPLANTABLE): HCPCS | Performed by: ORTHOPAEDIC SURGERY

## 2020-12-17 PROCEDURE — C1713 ANCHOR/SCREW BN/BN,TIS/BN: HCPCS | Performed by: ORTHOPAEDIC SURGERY

## 2020-12-17 PROCEDURE — 73020 X-RAY EXAM OF SHOULDER: CPT

## 2020-12-17 PROCEDURE — 25010000002 PROPOFOL 10 MG/ML EMULSION: Performed by: NURSE ANESTHETIST, CERTIFIED REGISTERED

## 2020-12-17 PROCEDURE — 25010000002 HYDRALAZINE PER 20 MG: Performed by: NURSE ANESTHETIST, CERTIFIED REGISTERED

## 2020-12-17 PROCEDURE — 23472 RECONSTRUCT SHOULDER JOINT: CPT | Performed by: ORTHOPAEDIC SURGERY

## 2020-12-17 PROCEDURE — 25010000002 MIDAZOLAM PER 1 MG: Performed by: ANESTHESIOLOGY

## 2020-12-17 PROCEDURE — 25010000003 CEFAZOLIN IN DEXTROSE 2-4 GM/100ML-% SOLUTION: Performed by: ORTHOPAEDIC SURGERY

## 2020-12-17 PROCEDURE — 25010000002 ROPIVACAINE PER 1 MG: Performed by: ANESTHESIOLOGY

## 2020-12-17 PROCEDURE — 0RRK00Z REPLACEMENT OF LEFT SHOULDER JOINT WITH REVERSE BALL AND SOCKET SYNTHETIC SUBSTITUTE, OPEN APPROACH: ICD-10-PCS | Performed by: ORTHOPAEDIC SURGERY

## 2020-12-17 PROCEDURE — 25010000002 ONDANSETRON PER 1 MG: Performed by: NURSE ANESTHETIST, CERTIFIED REGISTERED

## 2020-12-17 PROCEDURE — 25010000002 FENTANYL CITRATE (PF) 100 MCG/2ML SOLUTION: Performed by: ANESTHESIOLOGY

## 2020-12-17 PROCEDURE — 25010000002 VANCOMYCIN PER 500 MG: Performed by: ORTHOPAEDIC SURGERY

## 2020-12-17 DEVICE — COMP GLEN VERSA/DIAL PLS3 40MM: Type: IMPLANTABLE DEVICE | Site: SHOULDER | Status: FUNCTIONAL

## 2020-12-17 DEVICE — BLUE CO-BRAID POLYETHYLENE SIZE 2 38" C-7 NEEDLE BIOMET
Type: IMPLANTABLE DEVICE | Site: SHOULDER | Status: FUNCTIONAL
Brand: MAXBRAID PE

## 2020-12-17 DEVICE — SCRW FIX LK HEX 4.75X25MM: Type: IMPLANTABLE DEVICE | Site: SHOULDER | Status: FUNCTIONAL

## 2020-12-17 DEVICE — BASEPLT AUG W/MINI TPR ADAPT LG: Type: IMPLANTABLE DEVICE | Site: SHOULDER | Status: FUNCTIONAL

## 2020-12-17 DEVICE — KNOTLESS TISSUE CONTROL DEVICE, UNDYED UNIDIRECTIONAL (ANTIBACTERIAL) SYNTHETIC ABSORBABLE DEVICE
Type: IMPLANTABLE DEVICE | Site: SHOULDER | Status: FUNCTIONAL
Brand: STRATAFIX

## 2020-12-17 DEVICE — TRY HUM/SHLDR COMPREHENSIVE/REVERSE MINI COCR STD 40MM: Type: IMPLANTABLE DEVICE | Site: SHOULDER | Status: FUNCTIONAL

## 2020-12-17 DEVICE — BEAR HUM PROLNG STD 40MM: Type: IMPLANTABLE DEVICE | Site: SHOULDER | Status: FUNCTIONAL

## 2020-12-17 DEVICE — STEM HUM/SHLDR COMPREHENSIVE MINI 12X83MM: Type: IMPLANTABLE DEVICE | Site: SHOULDER | Status: FUNCTIONAL

## 2020-12-17 DEVICE — SCRW COMPRNSV CNTRL 6.5X25MM REUS: Type: IMPLANTABLE DEVICE | Site: SHOULDER | Status: FUNCTIONAL

## 2020-12-17 DEVICE — SCRW FIX LK HEX 4.75X20MM: Type: IMPLANTABLE DEVICE | Site: SHOULDER | Status: FUNCTIONAL

## 2020-12-17 DEVICE — SCRW FIX LK HEX 4.75X15MM: Type: IMPLANTABLE DEVICE | Site: SHOULDER | Status: FUNCTIONAL

## 2020-12-17 DEVICE — TOTL SHLDER REV: Type: IMPLANTABLE DEVICE | Site: SHOULDER | Status: FUNCTIONAL

## 2020-12-17 RX ORDER — HYDROCODONE BITARTRATE AND ACETAMINOPHEN 7.5; 325 MG/1; MG/1
1 TABLET ORAL ONCE AS NEEDED
Status: DISCONTINUED | OUTPATIENT
Start: 2020-12-17 | End: 2020-12-17 | Stop reason: HOSPADM

## 2020-12-17 RX ORDER — SODIUM CHLORIDE 0.9 % (FLUSH) 0.9 %
10 SYRINGE (ML) INJECTION EVERY 12 HOURS SCHEDULED
Status: DISCONTINUED | OUTPATIENT
Start: 2020-12-17 | End: 2020-12-17 | Stop reason: HOSPADM

## 2020-12-17 RX ORDER — VANCOMYCIN HYDROCHLORIDE 1 G/200ML
15 INJECTION, SOLUTION INTRAVENOUS ONCE
Status: COMPLETED | OUTPATIENT
Start: 2020-12-17 | End: 2020-12-17

## 2020-12-17 RX ORDER — PROPOFOL 10 MG/ML
VIAL (ML) INTRAVENOUS AS NEEDED
Status: DISCONTINUED | OUTPATIENT
Start: 2020-12-17 | End: 2020-12-17 | Stop reason: SURG

## 2020-12-17 RX ORDER — CEFAZOLIN SODIUM 2 G/100ML
2 INJECTION, SOLUTION INTRAVENOUS ONCE
Status: COMPLETED | OUTPATIENT
Start: 2020-12-17 | End: 2020-12-17

## 2020-12-17 RX ORDER — MELOXICAM 15 MG/1
15 TABLET ORAL DAILY
Status: DISCONTINUED | OUTPATIENT
Start: 2020-12-18 | End: 2020-12-18 | Stop reason: HOSPADM

## 2020-12-17 RX ORDER — POLYETHYLENE GLYCOL 3350 17 G/17G
17 POWDER, FOR SOLUTION ORAL DAILY
Status: DISCONTINUED | OUTPATIENT
Start: 2020-12-18 | End: 2020-12-18 | Stop reason: HOSPADM

## 2020-12-17 RX ORDER — MAGNESIUM HYDROXIDE 1200 MG/15ML
LIQUID ORAL AS NEEDED
Status: DISCONTINUED | OUTPATIENT
Start: 2020-12-17 | End: 2020-12-17 | Stop reason: HOSPADM

## 2020-12-17 RX ORDER — MELOXICAM 15 MG/1
15 TABLET ORAL ONCE
Status: COMPLETED | OUTPATIENT
Start: 2020-12-17 | End: 2020-12-17

## 2020-12-17 RX ORDER — EPHEDRINE SULFATE 50 MG/ML
5 INJECTION, SOLUTION INTRAVENOUS ONCE AS NEEDED
Status: DISCONTINUED | OUTPATIENT
Start: 2020-12-17 | End: 2020-12-17 | Stop reason: HOSPADM

## 2020-12-17 RX ORDER — DIPHENHYDRAMINE HYDROCHLORIDE 50 MG/ML
12.5 INJECTION INTRAMUSCULAR; INTRAVENOUS
Status: DISCONTINUED | OUTPATIENT
Start: 2020-12-17 | End: 2020-12-17 | Stop reason: HOSPADM

## 2020-12-17 RX ORDER — ROPIVACAINE HYDROCHLORIDE 5 MG/ML
INJECTION, SOLUTION EPIDURAL; INFILTRATION; PERINEURAL
Status: COMPLETED | OUTPATIENT
Start: 2020-12-17 | End: 2020-12-17

## 2020-12-17 RX ORDER — FENTANYL CITRATE 50 UG/ML
50 INJECTION, SOLUTION INTRAMUSCULAR; INTRAVENOUS
Status: DISCONTINUED | OUTPATIENT
Start: 2020-12-17 | End: 2020-12-17 | Stop reason: HOSPADM

## 2020-12-17 RX ORDER — PANTOPRAZOLE SODIUM 40 MG/1
40 TABLET, DELAYED RELEASE ORAL EVERY MORNING
Status: DISCONTINUED | OUTPATIENT
Start: 2020-12-18 | End: 2020-12-18 | Stop reason: HOSPADM

## 2020-12-17 RX ORDER — TRANEXAMIC ACID 100 MG/ML
INJECTION, SOLUTION INTRAVENOUS AS NEEDED
Status: DISCONTINUED | OUTPATIENT
Start: 2020-12-17 | End: 2020-12-17 | Stop reason: SURG

## 2020-12-17 RX ORDER — HYDRALAZINE HYDROCHLORIDE 20 MG/ML
5 INJECTION INTRAMUSCULAR; INTRAVENOUS
Status: DISCONTINUED | OUTPATIENT
Start: 2020-12-17 | End: 2020-12-17 | Stop reason: HOSPADM

## 2020-12-17 RX ORDER — LIDOCAINE HYDROCHLORIDE 20 MG/ML
INJECTION, SOLUTION INFILTRATION; PERINEURAL AS NEEDED
Status: DISCONTINUED | OUTPATIENT
Start: 2020-12-17 | End: 2020-12-17 | Stop reason: SURG

## 2020-12-17 RX ORDER — OXYCODONE AND ACETAMINOPHEN 7.5; 325 MG/1; MG/1
1 TABLET ORAL ONCE AS NEEDED
Status: DISCONTINUED | OUTPATIENT
Start: 2020-12-17 | End: 2020-12-17 | Stop reason: HOSPADM

## 2020-12-17 RX ORDER — HYDROCODONE BITARTRATE AND ACETAMINOPHEN 7.5; 325 MG/1; MG/1
1 TABLET ORAL EVERY 4 HOURS PRN
Status: DISCONTINUED | OUTPATIENT
Start: 2020-12-17 | End: 2020-12-18 | Stop reason: HOSPADM

## 2020-12-17 RX ORDER — HYDROCODONE BITARTRATE AND ACETAMINOPHEN 7.5; 325 MG/1; MG/1
2 TABLET ORAL EVERY 4 HOURS PRN
Status: DISCONTINUED | OUTPATIENT
Start: 2020-12-17 | End: 2020-12-18 | Stop reason: HOSPADM

## 2020-12-17 RX ORDER — FAMOTIDINE 10 MG/ML
20 INJECTION, SOLUTION INTRAVENOUS ONCE
Status: COMPLETED | OUTPATIENT
Start: 2020-12-17 | End: 2020-12-17

## 2020-12-17 RX ORDER — DOCUSATE SODIUM 100 MG/1
100 CAPSULE, LIQUID FILLED ORAL 2 TIMES DAILY PRN
Status: DISCONTINUED | OUTPATIENT
Start: 2020-12-17 | End: 2020-12-18 | Stop reason: HOSPADM

## 2020-12-17 RX ORDER — PREGABALIN 75 MG/1
150 CAPSULE ORAL ONCE
Status: COMPLETED | OUTPATIENT
Start: 2020-12-17 | End: 2020-12-17

## 2020-12-17 RX ORDER — PROMETHAZINE HYDROCHLORIDE 25 MG/1
25 TABLET ORAL ONCE AS NEEDED
Status: DISCONTINUED | OUTPATIENT
Start: 2020-12-17 | End: 2020-12-17 | Stop reason: HOSPADM

## 2020-12-17 RX ORDER — LABETALOL HYDROCHLORIDE 5 MG/ML
5 INJECTION, SOLUTION INTRAVENOUS
Status: DISCONTINUED | OUTPATIENT
Start: 2020-12-17 | End: 2020-12-17 | Stop reason: HOSPADM

## 2020-12-17 RX ORDER — SODIUM CHLORIDE, SODIUM LACTATE, POTASSIUM CHLORIDE, CALCIUM CHLORIDE 600; 310; 30; 20 MG/100ML; MG/100ML; MG/100ML; MG/100ML
9 INJECTION, SOLUTION INTRAVENOUS CONTINUOUS
Status: DISCONTINUED | OUTPATIENT
Start: 2020-12-17 | End: 2020-12-17 | Stop reason: HOSPADM

## 2020-12-17 RX ORDER — ONDANSETRON 2 MG/ML
INJECTION INTRAMUSCULAR; INTRAVENOUS AS NEEDED
Status: DISCONTINUED | OUTPATIENT
Start: 2020-12-17 | End: 2020-12-17 | Stop reason: SURG

## 2020-12-17 RX ORDER — ROCURONIUM BROMIDE 10 MG/ML
INJECTION, SOLUTION INTRAVENOUS AS NEEDED
Status: DISCONTINUED | OUTPATIENT
Start: 2020-12-17 | End: 2020-12-17 | Stop reason: SURG

## 2020-12-17 RX ORDER — SODIUM CHLORIDE 0.9 % (FLUSH) 0.9 %
10 SYRINGE (ML) INJECTION AS NEEDED
Status: DISCONTINUED | OUTPATIENT
Start: 2020-12-17 | End: 2020-12-18 | Stop reason: HOSPADM

## 2020-12-17 RX ORDER — DIPHENHYDRAMINE HCL 25 MG
25 CAPSULE ORAL
Status: DISCONTINUED | OUTPATIENT
Start: 2020-12-17 | End: 2020-12-17 | Stop reason: HOSPADM

## 2020-12-17 RX ORDER — MIDAZOLAM HYDROCHLORIDE 1 MG/ML
1 INJECTION INTRAMUSCULAR; INTRAVENOUS
Status: DISCONTINUED | OUTPATIENT
Start: 2020-12-17 | End: 2020-12-17 | Stop reason: HOSPADM

## 2020-12-17 RX ORDER — ONDANSETRON 2 MG/ML
4 INJECTION INTRAMUSCULAR; INTRAVENOUS ONCE AS NEEDED
Status: DISCONTINUED | OUTPATIENT
Start: 2020-12-17 | End: 2020-12-17 | Stop reason: HOSPADM

## 2020-12-17 RX ORDER — PROMETHAZINE HYDROCHLORIDE 25 MG/1
25 SUPPOSITORY RECTAL ONCE AS NEEDED
Status: DISCONTINUED | OUTPATIENT
Start: 2020-12-17 | End: 2020-12-17 | Stop reason: HOSPADM

## 2020-12-17 RX ORDER — SODIUM CHLORIDE, SODIUM LACTATE, POTASSIUM CHLORIDE, CALCIUM CHLORIDE 600; 310; 30; 20 MG/100ML; MG/100ML; MG/100ML; MG/100ML
100 INJECTION, SOLUTION INTRAVENOUS CONTINUOUS
Status: DISCONTINUED | OUTPATIENT
Start: 2020-12-17 | End: 2020-12-18 | Stop reason: HOSPADM

## 2020-12-17 RX ORDER — LIDOCAINE HYDROCHLORIDE AND EPINEPHRINE BITARTRATE 20; .01 MG/ML; MG/ML
INJECTION, SOLUTION SUBCUTANEOUS
Status: COMPLETED | OUTPATIENT
Start: 2020-12-17 | End: 2020-12-17

## 2020-12-17 RX ORDER — ONDANSETRON 2 MG/ML
4 INJECTION INTRAMUSCULAR; INTRAVENOUS EVERY 6 HOURS PRN
Status: DISCONTINUED | OUTPATIENT
Start: 2020-12-17 | End: 2020-12-18 | Stop reason: HOSPADM

## 2020-12-17 RX ORDER — ONDANSETRON 4 MG/1
4 TABLET, FILM COATED ORAL EVERY 6 HOURS PRN
Status: DISCONTINUED | OUTPATIENT
Start: 2020-12-17 | End: 2020-12-18 | Stop reason: HOSPADM

## 2020-12-17 RX ORDER — HYDROMORPHONE HYDROCHLORIDE 1 MG/ML
0.5 INJECTION, SOLUTION INTRAMUSCULAR; INTRAVENOUS; SUBCUTANEOUS
Status: DISCONTINUED | OUTPATIENT
Start: 2020-12-17 | End: 2020-12-18 | Stop reason: HOSPADM

## 2020-12-17 RX ORDER — NALOXONE HCL 0.4 MG/ML
0.1 VIAL (ML) INJECTION
Status: DISCONTINUED | OUTPATIENT
Start: 2020-12-17 | End: 2020-12-18 | Stop reason: HOSPADM

## 2020-12-17 RX ORDER — ACETAMINOPHEN 325 MG/1
1000 TABLET ORAL ONCE
Status: COMPLETED | OUTPATIENT
Start: 2020-12-17 | End: 2020-12-17

## 2020-12-17 RX ORDER — CEFAZOLIN SODIUM 2 G/100ML
2 INJECTION, SOLUTION INTRAVENOUS EVERY 8 HOURS
Status: COMPLETED | OUTPATIENT
Start: 2020-12-17 | End: 2020-12-17

## 2020-12-17 RX ORDER — FLUMAZENIL 0.1 MG/ML
0.2 INJECTION INTRAVENOUS AS NEEDED
Status: DISCONTINUED | OUTPATIENT
Start: 2020-12-17 | End: 2020-12-17 | Stop reason: HOSPADM

## 2020-12-17 RX ORDER — HYDROMORPHONE HYDROCHLORIDE 1 MG/ML
0.5 INJECTION, SOLUTION INTRAMUSCULAR; INTRAVENOUS; SUBCUTANEOUS
Status: DISCONTINUED | OUTPATIENT
Start: 2020-12-17 | End: 2020-12-17 | Stop reason: HOSPADM

## 2020-12-17 RX ORDER — ACETAMINOPHEN 325 MG/1
650 TABLET ORAL EVERY 4 HOURS PRN
Status: DISCONTINUED | OUTPATIENT
Start: 2020-12-17 | End: 2020-12-18 | Stop reason: HOSPADM

## 2020-12-17 RX ORDER — GLYCOPYRROLATE 0.2 MG/ML
INJECTION INTRAMUSCULAR; INTRAVENOUS AS NEEDED
Status: DISCONTINUED | OUTPATIENT
Start: 2020-12-17 | End: 2020-12-17 | Stop reason: SURG

## 2020-12-17 RX ORDER — SODIUM CHLORIDE 0.9 % (FLUSH) 0.9 %
3 SYRINGE (ML) INJECTION EVERY 12 HOURS SCHEDULED
Status: DISCONTINUED | OUTPATIENT
Start: 2020-12-17 | End: 2020-12-18 | Stop reason: HOSPADM

## 2020-12-17 RX ORDER — DEXAMETHASONE SODIUM PHOSPHATE 10 MG/ML
INJECTION INTRAMUSCULAR; INTRAVENOUS AS NEEDED
Status: DISCONTINUED | OUTPATIENT
Start: 2020-12-17 | End: 2020-12-17 | Stop reason: SURG

## 2020-12-17 RX ORDER — SODIUM CHLORIDE 0.9 % (FLUSH) 0.9 %
10 SYRINGE (ML) INJECTION AS NEEDED
Status: DISCONTINUED | OUTPATIENT
Start: 2020-12-17 | End: 2020-12-17 | Stop reason: HOSPADM

## 2020-12-17 RX ORDER — NALOXONE HCL 0.4 MG/ML
0.2 VIAL (ML) INJECTION AS NEEDED
Status: DISCONTINUED | OUTPATIENT
Start: 2020-12-17 | End: 2020-12-17 | Stop reason: HOSPADM

## 2020-12-17 RX ADMIN — CEFAZOLIN SODIUM 2 G: 2 INJECTION, SOLUTION INTRAVENOUS at 22:11

## 2020-12-17 RX ADMIN — LABETALOL HYDROCHLORIDE 5 MG: 5 INJECTION, SOLUTION INTRAVENOUS at 08:37

## 2020-12-17 RX ADMIN — LIDOCAINE HYDROCHLORIDE AND EPINEPHRINE 5 ML: 20; 10 INJECTION, SOLUTION INFILTRATION; PERINEURAL at 06:30

## 2020-12-17 RX ADMIN — MIDAZOLAM 2 MG: 1 INJECTION INTRAMUSCULAR; INTRAVENOUS at 06:24

## 2020-12-17 RX ADMIN — MUPIROCIN 1 APPLICATION: 20 OINTMENT TOPICAL at 19:57

## 2020-12-17 RX ADMIN — DEXAMETHASONE SODIUM PHOSPHATE 6 MG: 10 INJECTION INTRAMUSCULAR; INTRAVENOUS at 07:06

## 2020-12-17 RX ADMIN — GLYCOPYRROLATE 0.4 MG: 0.2 INJECTION INTRAMUSCULAR; INTRAVENOUS at 08:15

## 2020-12-17 RX ADMIN — ONDANSETRON HYDROCHLORIDE 4 MG: 2 SOLUTION INTRAMUSCULAR; INTRAVENOUS at 07:06

## 2020-12-17 RX ADMIN — FENTANYL CITRATE 50 MCG: 50 INJECTION, SOLUTION INTRAMUSCULAR; INTRAVENOUS at 06:24

## 2020-12-17 RX ADMIN — LIDOCAINE HYDROCHLORIDE 60 MG: 20 INJECTION, SOLUTION INFILTRATION; PERINEURAL at 06:58

## 2020-12-17 RX ADMIN — SODIUM CHLORIDE, POTASSIUM CHLORIDE, SODIUM LACTATE AND CALCIUM CHLORIDE 100 ML/HR: 600; 310; 30; 20 INJECTION, SOLUTION INTRAVENOUS at 12:14

## 2020-12-17 RX ADMIN — VANCOMYCIN HYDROCHLORIDE 1000 MG: 1 INJECTION, SOLUTION INTRAVENOUS at 17:25

## 2020-12-17 RX ADMIN — HYDRALAZINE HYDROCHLORIDE 5 MG: 20 INJECTION INTRAMUSCULAR; INTRAVENOUS at 09:02

## 2020-12-17 RX ADMIN — HYDROCODONE BITARTRATE AND ACETAMINOPHEN 2 TABLET: 7.5; 325 TABLET ORAL at 21:34

## 2020-12-17 RX ADMIN — PREGABALIN 150 MG: 75 CAPSULE ORAL at 05:43

## 2020-12-17 RX ADMIN — TRANEXAMIC ACID 1000 MG: 100 INJECTION, SOLUTION INTRAVENOUS at 07:21

## 2020-12-17 RX ADMIN — FAMOTIDINE 20 MG: 10 INJECTION INTRAVENOUS at 06:24

## 2020-12-17 RX ADMIN — ROPIVACAINE HYDROCHLORIDE 20 ML: 5 INJECTION, SOLUTION EPIDURAL; INFILTRATION; PERINEURAL at 06:30

## 2020-12-17 RX ADMIN — NEOSTIGMINE METHYLSULFATE 3 MG: 1 INJECTION INTRAMUSCULAR; INTRAVENOUS; SUBCUTANEOUS at 08:15

## 2020-12-17 RX ADMIN — LABETALOL HYDROCHLORIDE 5 MG: 5 INJECTION, SOLUTION INTRAVENOUS at 08:42

## 2020-12-17 RX ADMIN — LABETALOL HYDROCHLORIDE 5 MG: 5 INJECTION, SOLUTION INTRAVENOUS at 08:56

## 2020-12-17 RX ADMIN — ROCURONIUM BROMIDE 30 MG: 10 INJECTION INTRAVENOUS at 06:58

## 2020-12-17 RX ADMIN — CEFAZOLIN SODIUM 2 G: 2 INJECTION, SOLUTION INTRAVENOUS at 07:02

## 2020-12-17 RX ADMIN — VANCOMYCIN HYDROCHLORIDE 1000 MG: 1 INJECTION, SOLUTION INTRAVENOUS at 05:54

## 2020-12-17 RX ADMIN — SODIUM CHLORIDE, POTASSIUM CHLORIDE, SODIUM LACTATE AND CALCIUM CHLORIDE 9 ML/HR: 600; 310; 30; 20 INJECTION, SOLUTION INTRAVENOUS at 06:24

## 2020-12-17 RX ADMIN — PROPOFOL 150 MG: 10 INJECTION, EMULSION INTRAVENOUS at 06:58

## 2020-12-17 RX ADMIN — MELOXICAM 15 MG: 15 TABLET ORAL at 05:43

## 2020-12-17 RX ADMIN — HYDROCODONE BITARTRATE AND ACETAMINOPHEN 1 TABLET: 7.5; 325 TABLET ORAL at 17:25

## 2020-12-17 RX ADMIN — CEFAZOLIN SODIUM 2 G: 2 INJECTION, SOLUTION INTRAVENOUS at 14:42

## 2020-12-17 RX ADMIN — LABETALOL HYDROCHLORIDE 5 MG: 5 INJECTION, SOLUTION INTRAVENOUS at 08:48

## 2020-12-17 RX ADMIN — ACETAMINOPHEN 975 MG: 325 TABLET, FILM COATED ORAL at 05:43

## 2020-12-17 NOTE — BRIEF OP NOTE
TOTAL SHOULDER REVERSE ARTHROPLASTY  Progress Note    Ashwin Anand  12/17/2020    Pre-op Diagnosis:   Shoulder arthritis [M19.019]       Post-Op Diagnosis Codes:     * Shoulder arthritis [M19.019]    Procedure/CPT® Codes:        Procedure(s):  TOTAL SHOULDER REVERSE ARTHROPLASTY, biceps tenodesis    Surgeon(s):  Ashutosh Mehta MD    Anesthesia: General with Block    Staff:   Circulator: Mojgan Sutton RN; Sara Hathaway RN  Scrub Person: Zohreh Castanon  Vendor Representative: Jeronimo Sorto  Assistant: Chad Gómez CSA  Assistant: Chad Gómez CSA      Estimated Blood Loss: 100ml    Urine Voided: * No values recorded between 12/17/2020  6:51 AM and 12/17/2020  8:18 AM *    Specimens:                None          Drains: * No LDAs found *    Findings: see dictation    Complications: none    Assistant: Chad Gómez CSA  was responsible for performing the following activities: Retraction, suctioning and irrigation, manipulation of the arm for insertion of the implants and wound closure and their skilled assistance was necessary for the success of this case.    Ashutosh Mehta MD     Date: 12/17/2020  Time: 08:18 EST

## 2020-12-17 NOTE — OP NOTE
Orthopaedic Operative Note    Facility: [facility]    Patient: Ashwin Anand    Medical Record Number: [id number]    YOB: 1960    Dictating Surgeon: Ashutosh Mehta M.D.*    Primary Care Physician: Sophie Rascon III, CHRISTOPHER-C    Date of Operation: 12/17/2020    Pre-Operative Diagnosis:  Left shoulder end-stage osteoarthritis with severe glenoid retroversion    Post-Operative Diagnosis:  Left shoulder end-stage osteoarthritis with severe glenoid retroversion    Procedure Performed:    1.  Left reverse total shoulder arthroplasty    2.  Tenodesis of long head of biceps tendon    Surgeon: Ashutosh Mehta MD     Assistant: Chad Carrero CSA was responsible for performing the following activities: Retraction, suctioning and irrigation, manipulation of the arm for insertion of the implants and wound closure and their skilled assistance was necessary for the success of this case.    Anesthesia: Regional followed by Gen.    Complications: None.     Estimated Blood Loss: Less than 50 mL.     Implants: 1.  Biomet size 12 mm mini comprehensive stem  2.  Large augment mini glenoid baseplate with one 6.5 mm central compression screw and 4 peripheral 4.75 mm locking screws  3.  Size 40 mm +3 offset glenosphere  4.  Standard thickness, standard offset humeral tray with 40 mm standard humeral bearing    Specimens: * No orders in the log *    Brief Operative Indication:  Mr. Anand had a history of worsening shoulder pain and dysfunction which had been nonresponsive to conservative treatment.  We had a thorough discussion regarding the risks, benefits and alternatives to an arthroplasty and non-surgical management versus surgery.  I explained that surgical risks include infection, hematoma, hardware related complications including failure of fixation, loosening, fracture, persistent pain and/or loss of motion, iatrogenic nerve and/or blood vessel injury resulting in permanent weakness,  numbness or dysfunction, DVT, PE, positioning related neuropraxia, and anesthesia related complications resulting in death.  We discussed the indication for a reverse as opposed to a standard total shoulder and the risks inherent to the reverse including notching, glenoid loosening, instability, and traction related neuropraxia, any of which could result in persistent pain or problems requiring further surgery.  Last, we discussed the rehab and all that will be expected of the patient post operatively to ensure an optimal outcome.  The patient voiced understanding of the risks, benefits, and alternative forms of treatment that were discussed and the patient consented to proceed.    Description of the procedure in detail:  The patient and operative site were identified in the preoperative holding area.  The surgical site was marked.  Adequate regional anesthesia was administered.  The patient was then taken to the operating room.  The patient was placed on the operating table where adequate general anesthesia was administered.  The patient was then repositioned into the modified beachchair position with the head and neck in neutral alignment.  All bony prominences were carefully padded and protected.    The left upper extremity was then prepped and draped in the standard, sterile fashion.  The extremity was cleaned with an alcohol solution.  A Hibiclens scrub was performed and then the extremity was prepped with 2 ChloraPrep preps.  I allowed this to dry for 3 minutes before the draping procedure was carried out.    A timeout was taken and preoperative antibiotics administered.  Transexamic acid was administered at this time as well.  Following this, I began by fashioning an approximately 6 cm incision over the anterior aspect of the shoulder.  This was carried down through the skin and subcutaneous tissues.  Full-thickness medial and lateral skin flaps were developed.  The interval between the deltoid and pectoralis  was carefully identified and developed.  The underlying cephalic vein was dissected out and retracted laterally.  This structure was kept protected throughout the case.    The clavipectoral fascia was divided.  The strap muscles were retracted medially.  The biceps groove was identified.  The biceps tendon was carefully dissected out.  The biceps was released from its attachment to the supraglenoid tubercle using curved Renee scissors.  The diseased, intra-articular portion of the biceps was removed.  The remaining intact tendon was tenodesed to the pectoralis tendon using multiple max braid sutures which were tied using 6 throw surgeon's knots.    I then directed my attention to the shoulder. The subscapularis was carefully tagged and released.  I left a small cuff of tissue on the lesser tuberosity for a later anatomic repair of this structure.   There was a small tear of the anterior supraspinatus but his infraspinatus and teres minor were intact.  The humeral head was then completely exposed.  The periarticular osteophytes were carefully removed with a rongeur.    The cutting guide for the head cut was inserted.  This was set to 20° of retroversion which I judged off of the forearm.  With the guide in position, I demarcated the cut and then carried out the cut using an oscillating saw.  The cut portion of the bone was removed and taken to the back table for possible bone grafting later in the case, if needed.    Having completed the humeral sided preparations, I then directed my attention to the glenoid.  The axillary nerve was carefully dissected out and identified.  This structure was protected.  Retractors were carefully positioned along the anterior, posterior and inferior glenoid rim.  I carefully exposed the caudal rim of the glenoid using the electrocautery.  The anterior, inferior and posterior glenoid labrum were then carefully debrided and removed along with the remaining biceps stump superiorly.  He  had severe retroversion of the glenoid and I determined that placement of an augment was necessary.  The augmented centering guide for the baseplate was inserted.  The center pin for the baseplate was drilled in the center of the glenoid vault.  The reaming process was carried out in typical fashion, taking care to maintain appropriate inferior tilt and correctively ream for placement of the augmented baseplate.  I trialed with multiple augments.  The large augment seemed to fit best.     Once I had completed the reaming and trialing process and made sure that the reaming was adequate, the baseplate was impacted into position.  This was secured with a single screw central compression screw which got great purchase.  The 4 peripheral locking screws were then placed without complication.  All 4 screws were confirmed to lock into the baseplate.  With the baseplate secured, I examined the remaining glenoid.  I did determine that a 40 mm +3 offset glenosphere would fit best in this case.  The appropriate size implant was selected for use and then impacted.  I took care to make sure that the Pearce taper was fully engaged and that the implant was well-seated.  I used a right angle clamp to pass this beneath the implant and pull up.  When doing so, the entire scapula moved.  Once I was satisfied that this was well seated, I then directed my attention back to the humerus.    The humeral sided preparations were carried out in the typical fashion.  I reamed and broached the humerus up to a size 12 broach which seemed to fit well.  The appropriate size implant was impacted into position, taking care to maintain appropriate retroversion as judged off of the forearm.  The implants seated well.  I then trialed off of the stem.  The standard thickness, standard offset trial tray with a standard thickness liner seemed to fit best.  This allowed for excellent motion and stability.  The trial component was removed and then the final  component impacted.  Again, I took care to make sure that the Pearce taper was fully engaged before reducing it and carrying the shoulder through range of motion.    Again, the shoulder demonstrated excellent motion and stability.  I could fully elevate, abduct and externally rotate the shoulder without any impingement or limitation.  The shoulder demonstrated excellent motion and absolutely no instability.  There was good tension in the periarticular soft tissue structures.  At this point, I directed my attention to the subscapularis repair.  The arm was placed in approximately 30 degrees of external rotation.  The subscapularis was then anatomically repaired using multiple #2 max braid sutures.  I then irrigated the wound with 500 cc of a Betadine-containing saline solution.  I then irrigated with 3 L of sterile saline via pulsatile lavage.  I made sure that we had good hemostasis.  The wound was sequentially closed in a layered fashion.  Vicryl was used to repair the subcutaneous tissues.  A running subcuticular Monocryl stitch was used to close the skin followed by Dermabond.  Sterile dressings were applied.  The drapes were withdrawn.  The arm was placed in a sling.  The patient was awakened and taken to the recovery room in good condition.    Ashutosh Mehta MD  12/17/20

## 2020-12-17 NOTE — ANESTHESIA PROCEDURE NOTES
Peripheral Block      Patient location during procedure: pre-op  Reason for block: at surgeon's request and post-op pain management  Performed by  Anesthesiologist: Rizwan Koo MD  Preanesthetic Checklist  Completed: patient identified, site marked, surgical consent, pre-op evaluation, timeout performed, IV checked, risks and benefits discussed and monitors and equipment checked  Prep:  Pt Position: supine  Sterile barriers:cap, gloves and mask  Prep: ChloraPrep  Patient monitoring: blood pressure monitoring, continuous pulse oximetry and EKG  Procedure  Sedation:yes  Performed under: local infiltration  Guidance:ultrasound guided  ULTRASOUND INTERPRETATION. Using ultrasound guidance a 22 G gauge needle was placed in close proximity to the nerve, at which point, under ultrasound guidance anesthetic was injected in the area of the nerve and spread of the anesthesia was seen on ultrasound in close proximity thereto.  There were no abnormalities seen on ultrasound; a digital image was taken; and the patient tolerated the procedure with no complications. Images:still images obtained    Laterality:left  Block Type:interscalene  Injection Technique:single-shot  Needle Type:echogenic  Needle Gauge:22 G      Medications Used: ropivacaine (NAROPIN) 0.5 % injection, 20 mL  lidocaine-EPINEPHrine (XYLOCAINE W/EPI) 2 %-1:[id number] injection, 5 mL  Med admintered at 12/17/2020 6:30 AM      Post Assessment  Injection Assessment: negative aspiration for heme, no paresthesia on injection and incremental injection  Patient Tolerance:comfortable throughout block  Complications:no

## 2020-12-17 NOTE — ANESTHESIA POSTPROCEDURE EVALUATION
Patient: Ashwin Anand    Procedure Summary     Date: 12/17/20 Room / Location: [facility] OR [address] [place] [address] MAIN OR    Anesthesia Start: 0654 Anesthesia Stop: 0833    Procedure: TOTAL SHOULDER REVERSE ARTHROPLASTY (Left Shoulder) Diagnosis:       Shoulder arthritis      (Shoulder arthritis [M19.019])    Surgeon: Ashutosh Mehta MD Provider: Rizwan Koo MD    Anesthesia Type: general with block ASA Status: 2          Anesthesia Type: general with block    Vitals  Vitals Value Taken Time   /93 12/17/20 0930   Temp 36.6 °C (97.8 °F) 12/17/20 0830   Pulse 58 12/17/20 0931   Resp 16 12/17/20 0915   SpO2 100 % 12/17/20 0931   Vitals shown include unvalidated device data.        Post Anesthesia Care and Evaluation    Patient location during evaluation: PACU  Patient participation: complete - patient participated  Level of consciousness: awake and alert  Pain management: adequate  Airway patency: patent  Anesthetic complications: No anesthetic complications  PONV Status: none  Cardiovascular status: acceptable and hemodynamically stable  Respiratory status: acceptable  Hydration status: acceptable

## 2020-12-17 NOTE — ANESTHESIA PREPROCEDURE EVALUATION
Anesthesia Evaluation     Patient summary reviewed and Nursing notes reviewed   no history of anesthetic complications:  NPO Solid Status: > 6 hours  NPO Liquid Status: > 6 hours           Airway   Mallampati: II  TM distance: >3 FB  Neck ROM: full  no difficulty expected and No difficulty expected  Dental - normal exam     Pulmonary - normal exam    breath sounds clear to auscultation  (+) COPD,   (-) rhonchi, decreased breath sounds, wheezes, rales, stridor  Cardiovascular - negative cardio ROS and normal exam    NYHA Classification: I  ECG reviewed  Rhythm: regular  Rate: normal    (-) murmur, weak pulses, friction rub, systolic click, carotid bruits, JVD, peripheral edema      Neuro/Psych  (+) psychiatric history Schizophrenia,     GI/Hepatic/Renal/Endo    (+)  GERD,      Musculoskeletal     Abdominal  - normal exam    Abdomen: soft.   Substance History - negative use     OB/GYN negative ob/gyn ROS         Other   arthritis,                      Anesthesia Plan    ASA 2     general with block     intravenous induction     Anesthetic plan, all risks, benefits, and alternatives have been provided, discussed and informed consent has been obtained with: patient.

## 2020-12-17 NOTE — PLAN OF CARE
Goal Outcome Evaluation:  Plan of Care Reviewed With: patient  Progress: improving  Outcome Summary: pod 0 left TSA reverse. vss. dsg cdi. ambulates well with x1 assist. denies pain dt numbness. plan to dc home tomorrow. educated on fall prevention.

## 2020-12-18 ENCOUNTER — READMISSION MANAGEMENT (OUTPATIENT)
Dept: CALL CENTER | Facility: HOSPITAL | Age: 60
End: 2020-12-18

## 2020-12-18 VITALS
DIASTOLIC BLOOD PRESSURE: 69 MMHG | TEMPERATURE: 97.3 F | BODY MASS INDEX: 24.16 KG/M2 | OXYGEN SATURATION: 98 % | RESPIRATION RATE: 16 BRPM | WEIGHT: 145 LBS | HEIGHT: 65 IN | HEART RATE: 89 BPM | SYSTOLIC BLOOD PRESSURE: 105 MMHG

## 2020-12-18 PROCEDURE — 99024 POSTOP FOLLOW-UP VISIT: CPT | Performed by: NURSE PRACTITIONER

## 2020-12-18 PROCEDURE — 97535 SELF CARE MNGMENT TRAINING: CPT

## 2020-12-18 PROCEDURE — 97166 OT EVAL MOD COMPLEX 45 MIN: CPT

## 2020-12-18 PROCEDURE — 97110 THERAPEUTIC EXERCISES: CPT

## 2020-12-18 RX ORDER — HYDROCODONE BITARTRATE AND ACETAMINOPHEN 7.5; 325 MG/1; MG/1
TABLET ORAL
Qty: 42 TABLET | Refills: 0 | Status: SHIPPED | OUTPATIENT
Start: 2020-12-18 | End: 2021-03-12

## 2020-12-18 RX ORDER — ONDANSETRON 4 MG/1
4 TABLET, FILM COATED ORAL EVERY 6 HOURS PRN
Qty: 12 TABLET | Refills: 0 | Status: SHIPPED | OUTPATIENT
Start: 2020-12-18 | End: 2021-03-12

## 2020-12-18 RX ORDER — PSEUDOEPHEDRINE HCL 30 MG
100 TABLET ORAL 2 TIMES DAILY
Qty: 60 CAPSULE | Refills: 1 | Status: SHIPPED | OUTPATIENT
Start: 2020-12-18 | End: 2021-03-12

## 2020-12-18 RX ADMIN — PANTOPRAZOLE SODIUM 40 MG: 40 TABLET, DELAYED RELEASE ORAL at 08:01

## 2020-12-18 RX ADMIN — SODIUM CHLORIDE, PRESERVATIVE FREE 3 ML: 5 INJECTION INTRAVENOUS at 08:01

## 2020-12-18 RX ADMIN — POLYETHYLENE GLYCOL 3350 17 G: 17 POWDER, FOR SOLUTION ORAL at 08:01

## 2020-12-18 RX ADMIN — HYDROCODONE BITARTRATE AND ACETAMINOPHEN 2 TABLET: 7.5; 325 TABLET ORAL at 10:29

## 2020-12-18 RX ADMIN — HYDROCODONE BITARTRATE AND ACETAMINOPHEN 2 TABLET: 7.5; 325 TABLET ORAL at 06:08

## 2020-12-18 RX ADMIN — MUPIROCIN 1 APPLICATION: 20 OINTMENT TOPICAL at 08:01

## 2020-12-18 RX ADMIN — MELOXICAM 15 MG: 15 TABLET ORAL at 08:01

## 2020-12-18 RX ADMIN — HYDROCODONE BITARTRATE AND ACETAMINOPHEN 2 TABLET: 7.5; 325 TABLET ORAL at 02:16

## 2020-12-18 NOTE — OUTREACH NOTE
Prep Survey      Responses   [facility] patient discharged from?  [place]   Is LACE score < 7 ?  Yes   Eligibility  [facility]   Date of Admission  12/17/20   Date of Discharge  12/18/20   Discharge Disposition  Home or Self Care   Discharge diagnosis  TOTAL SHOULDER ARTHROPLASTY    Does the patient have one of the following disease processes/diagnoses(primary or secondary)?  Total Joint Replacement   Does the patient have Home health ordered?  No   Is there a DME ordered?  No   Prep survey completed?  Yes          Palmira Og RN

## 2020-12-18 NOTE — PROGRESS NOTES
Continued Stay Note  [facility]     Patient Name: Ashwin Anand  MRN: [id number]  Today's Date: 12/18/2020    Admit Date: 12/17/2020    Discharge Plan     Row Name 12/18/20 1003       Plan    Final Discharge Disposition Code  01 - home or self-care    Final Note  Pt to d/c home s/p TSA.        Discharge Codes    No documentation.       Expected Discharge Date and Time     Expected Discharge Date Expected Discharge Time    Dec 18, 2020             Tiana Espinosa RN

## 2020-12-18 NOTE — PLAN OF CARE
Goal Outcome Evaluation:  Plan of Care Reviewed With: patient  Progress: improving  Outcome Summary: Pt seen by OT for evaluation this date. Upon arrival pt sitting up in bed, A&O x4, sling donned to LUE and pleasant. Pt performed bed mobility with [place] to sit EOB. OT doffed sling for ADL completion. Sling donned upon completion of ADL and UE therex. Pt completed UB/LB dressing task seated EOB and in standing position demonstrating good static/dynamic sitting and standing balance for clothing adjustment with AD at this time. Pt instructed in pendulums this date and UE therex. Pt able to demonstrate pendulums with appropriate technique. Pt to D/C home with OP services. Pt wore mask, OT wore mask, glasses, gloves, hand hygiene performed. Pt left up in chair, needs in reach, sling donned to LUE, alarm on.

## 2020-12-18 NOTE — THERAPY EVALUATION
Patient Name: Ashwin Anand  : 1960    MRN: [id number]                              Today's Date: 2020       Admit Date: 2020    Visit Dx:     ICD-10-CM ICD-9-CM   1. Shoulder arthritis  M19.019 716.91     Patient Active Problem List   Diagnosis   • Schizophrenia, schizo-affective (CMS/HCC)   • COPD (chronic obstructive pulmonary disease) (CMS/HCC)   • Gastroesophageal reflux disease without esophagitis   • Pain   • Shoulder arthritis     Past Medical History:   Diagnosis Date   • Arthritis    • COPD (chronic obstructive pulmonary disease) (CMS/HCC)    • GERD (gastroesophageal reflux disease)    • Left shoulder pain    • Schizophrenia, schizo-affective (CMS/HCC)     PATIENT DENIES THIS IN HIS MEDICAL HISTORY     Past Surgical History:   Procedure Laterality Date   • ANTERIOR CERVICAL DISCECTOMY W/ FUSION     • APPENDECTOMY     • FINGER SURGERY Right     TENDON AND NERVE REPAIR   • KNEE SURGERY Right     AS A CHILD   • LUMBAR DISCECTOMY     • TOTAL SHOULDER ARTHROPLASTY W/ DISTAL CLAVICLE EXCISION Left 2020    Procedure: TOTAL SHOULDER REVERSE ARTHROPLASTY;  Surgeon: Ashutosh Mehta MD;  Location: [facility];  Service: Orthopedics;  Laterality: Left;     General Information     Row Name 20          OT Time and Intention    Document Type  evaluation  -BL     Mode of Treatment  occupational therapy  -BL     Row Name 20          General Information    Patient Profile Reviewed  yes  -BL     Prior Level of Function  independent:;ADL's;transfer  -BL     Existing Precautions/Restrictions  left sling on LUE  -BL     Barriers to Rehab  none identified  -BL     Row Name 20          Living Environment    Lives With  spouse  -BL     Row Name 20          Cognition    Orientation Status (Cognition)  oriented x 4  -BL     Row Name 20          Safety Issues, Functional Mobility    Safety Issues Affecting Function (Mobility)  ability to follow  commands  -BL     Comment, Safety Issues/Impairments (Mobility)  2/2 pt LUE shoulder procedure pt has limited ROM and strength, however, this does not hinder pt's ability to complete ADL/IADL task this date  -       User Key  (r) = Recorded By, (t) = Taken By, (c) = Cosigned By    Initials Name Provider Type    Praful Clemens OT Occupational Therapist          Mobility/ADL's     Row Name 12/18/20 0927          Bed Mobility    Bed Mobility  scooting/bridging;supine-sit  -BL     Scooting/Bridging [place] (Bed Mobility)  independent  -BL     Supine-Sit [place] (Bed Mobility)  independent  -BL     Row Name 12/18/20 0927          Transfers    Transfers  bed-chair transfer;sit-stand transfer  -     Bed-Chair [place] (Transfers)  independent  -BL     Sit-Stand [place] (Transfers)  independent  -BL     Row Name 12/18/20 0927          Functional Mobility    Functional Mobility-Distance (Feet)  15 Pt ambulated about room with AD at this time  -     Row Name 12/18/20 0927          Activities of Daily Living    BADL Assessment/Intervention  upper body dressing;lower body dressing  -     Row Name 12/18/20 0927          Mobility    Extremity Weight-bearing Status  left upper extremity  -BL     Left Upper Extremity (Weight-bearing Status)  other (see comments) no weight bearing status at this time. Pt treatment as NWB  -BL     Row Name 12/18/20 0927          Upper Body Dressing Assessment/Training    [place] Level (Upper Body Dressing)  doff;don;front opening garment;independent  -BL     Position (Upper Body Dressing)  unsupported standing  -BL     Row Name 12/18/20 0927          Lower Body Dressing Assessment/Training    [place] Level (Lower Body Dressing)  doff;don;pants/bottoms;shoes/slippers;independent  -BL     Position (Lower Body Dressing)  supported sitting  -BL       User Key  (r) = Recorded By, (t) = Taken By, (c) = Cosigned By    Initials Name Provider Type    CAMPOS Brannon  WILLIAM Basilio Occupational Therapist        Obj/Interventions     Row Name 12/18/20 0930          Sensory Assessment (Somatosensory)    Sensory Assessment (Somatosensory)  UE sensation intact  -     Row Name 12/18/20 0930          Strength Comprehensive (MMT)    Comment, General Manual Muscle Testing (MMT) Assessment  4/5 RUE. LUE MMT not tested at this time  -     Row Name 12/18/20 0930          Balance    Balance Interventions  standing;sitting;sit to stand;supported;static;dynamic;occupation based/functional task  -       User Key  (r) = Recorded By, (t) = Taken By, (c) = Cosigned By    Initials Name Provider Type     Praful Brannon OT Occupational Therapist        Goals/Plan     Row Name 12/18/20 0938          ROM Goal 1 (OT)    ROM Goal 1 (OT)  Pt will demonstrate independence with HEP prior to discharge in order to be compliant with doctors orders. Pt able to demonstrate independence with HEP this date. Goal met  -BL     Time Frame (ROM Goal 1, OT)  short term goal (STG)  -BL     Progress/Outcome (ROM Goal 1, OT)  goal met  -BL       User Key  (r) = Recorded By, (t) = Taken By, (c) = Cosigned By    Initials Name Provider Type     Praful Brannon OT Occupational Therapist        Clinical Impression     Row Name 12/18/20 0931          Pain Assessment    Additional Documentation  Pain Scale: Numbers Pre/Post-Treatment (Group)  -     Row Name 12/18/20 0931          Pain Scale: Numbers Pre/Post-Treatment    Pretreatment Pain Rating  0/10 - no pain  -BL     Posttreatment Pain Rating  0/10 - no pain  -BL     Pain Location - Side  Left  -BL     Pain Location - Orientation  upper  -BL     Pain Location  extremity  -BL     Row Name 12/18/20 0931          Plan of Care Review    Plan of Care Reviewed With  patient  -BL     Progress  improving  -BL     Outcome Summary  Pt seen by OT for evaluation this date. Upon arrival pt sitting up in bed, A&O x4, sling donned to LUE and pleasant. Pt performed bed  mobility with [place] to sit EOB. OT doffed sling for ADL completion. Sling donned upon completion of ADL and UE therex. Pt completed UB/LB dressing task seated EOB and in standing position demonstrating good static/dynamic sitting and standing balance for clothing adjustment with AD at this time. Pt instructed in pendulums this date and UE therex. Pt able to demonstrate pendulums with appropriate technique. Pt to D/C home with OP services. Pt wore mask, OT wore mask, glasses, gloves, hand hygiene performed. Pt left up in chair, needs in reach, sling donned to LUE, alarm on.  -     Row Name 12/18/20 0931          Therapy Assessment/Plan (OT)    Rehab Potential (OT)  good, to achieve stated therapy goals  -     Criteria for Skilled Therapeutic Interventions Met (OT)  yes  -     Therapy Frequency (OT)  evaluation only  -     Row Name 12/18/20 0931          Therapy Plan Review/Discharge Plan (OT)    Anticipated Discharge Disposition (OT)  home with outpatient therapy services  -     Row Name 12/18/20 0931          Vital Signs    Pre Patient Position  Supine  -BL     Intra Patient Position  Sitting  -BL     Post Patient Position  Sitting  -BL     Row Name 12/18/20 0931          Positioning and Restraints    Pre-Treatment Position  in bed  -BL     Post Treatment Position  chair  -BL     In Chair  notified nsg;sitting;call light within reach;encouraged to call for assist;exit alarm on LUE sling donned  -BL       User Key  (r) = Recorded By, (t) = Taken By, (c) = Cosigned By    Initials Name Provider Type    BL Praful Brannon OT Occupational Therapist        Outcome Measures     Row Name 12/18/20 0939          How much help from another is currently needed...    Putting on and taking off regular lower body clothing?  4  -BL     Bathing (including washing, rinsing, and drying)  3  -BL     Toileting (which includes using toilet bed pan or urinal)  4  -BL     Putting on and taking off regular upper body  clothing  4  -BL     Taking care of personal grooming (such as brushing teeth)  4  -BL     Eating meals  4  -BL     AM-PAC 6 Clicks Score (OT)  23  -BL     Row Name 12/18/20 0939          Functional Assessment    Outcome Measure Options  AM-PAC 6 Clicks Daily Activity (OT)  -BL       User Key  (r) = Recorded By, (t) = Taken By, (c) = Cosigned By    Initials Name Provider Type     Praful Brannon OT Occupational Therapist        Occupational Therapy Education                 Title: PT OT SLP Therapies (Done)     Topic: Occupational Therapy (Done)     Point: ADL training (Done)     Description:   Instruct learner(s) on proper safety adaptation and remediation techniques during self care or transfers.   Instruct in proper use of assistive devices.              Learning Progress Summary           Patient Acceptance, E,D, VU,DU by  at 12/18/2020 0940                   Point: Home exercise program (Done)     Description:   Instruct learner(s) on appropriate technique for monitoring, assisting and/or progressing therapeutic exercises/activities.              Learning Progress Summary           Patient Acceptance, E,D, VU,DU by  at 12/18/2020 0940                   Point: Precautions (Done)     Description:   Instruct learner(s) on prescribed precautions during self-care and functional transfers.              Learning Progress Summary           Patient Acceptance, E,D, VU,DU by  at 12/18/2020 0940                               User Key     Initials Effective Dates Name Provider Type [facility] 11/13/20 -  Praful Brannon OT Occupational Therapist OT              OT Recommendation and Plan  Therapy Frequency (OT): evaluation only  Plan of Care Review  Plan of Care Reviewed With: patient  Progress: improving  Outcome Summary: Pt seen by OT for evaluation this date. Upon arrival pt sitting up in bed, A&O x4, sling donned to LUE and pleasant. Pt performed bed mobility with [place] to sit EOB. OT doffed  sling for ADL completion. Sling donned upon completion of ADL and UE therex. Pt completed UB/LB dressing task seated EOB and in standing position demonstrating good static/dynamic sitting and standing balance for clothing adjustment with AD at this time. Pt instructed in pendulums this date and UE therex. Pt able to demonstrate pendulums with appropriate technique. Pt to D/C home with OP services. Pt wore mask, OT wore mask, glasses, gloves, hand hygiene performed. Pt left up in chair, needs in reach, sling donned to LUE, alarm on.     Time Calculation:   Time Calculation- OT     Row Name 12/18/20 0941             Time Calculation- OT    OT Start Time  0806  -      OT Stop Time  0833  -      OT Time Calculation (min)  27 min  -      Total Timed Code Minutes- OT  23 minute(s)  -      OT Received On  12/18/20  -      OT - Next Appointment  -- no f/u necessary at this time for OT  -BL      OT Goal Re-Cert Due Date  01/01/21  -        User Key  (r) = Recorded By, (t) = Taken By, (c) = Cosigned By    Initials Name Provider Type     Praful Brannon OT Occupational Therapist        Therapy Charges for Today     Code Description Service Date Service Provider Modifiers Qty    91923711696  OT EVAL MOD COMPLEXITY 2 12/18/2020 Praful Brannon OT GO 1    99408590161 HC OT SELF CARE/MGMT/TRAIN EA 15 MIN 12/18/2020 Praful Brannon OT GO 1    [id number]  OT THER PROC EA 15 MIN 12/18/2020 Praful Brannon OT GO 1               Radha Brannon OT  12/18/2020

## 2020-12-18 NOTE — PLAN OF CARE
Goal Outcome Evaluation:  Plan of Care Reviewed With: patient  Progress: improving  Outcome Summary: patient ambulating with assistance, voiding without difficulty, pain controlled at this time, educated on COPD management

## 2020-12-18 NOTE — DISCHARGE SUMMARY
Date of Admission:  12/17/2020    Date of Discharge:  12/18/2020    Discharge Diagnosis: s/p Left reverse total shoulder arthroplasty    Admitting Physician: Ashutosh Mehta    Consults: none    DETAILS OF HOSPITAL STAY:  Patient is a 60 y.o. male was admitted to the floor following a reverse total shoulder arthroplasty.  Post-operatively the patient was transferred to the post-operative floor where the patient underwent physical therapy including both active and passive ROM exercises. Opioids were titrated to achieve appropriate pain management to allow for participation in mobilization exercises. Vital signs are now stable. The incision is benign without signs or symptoms of infection. Operative extremity neurovascular status remains intact. Appropriate education re: incision care, activity levels, medications, and follow up visits was completed and all questions were answered. The patient is now deemed stable for discharge to home.    Condition on Discharge:  Stable    Discharge Medications     Discharge Medications      New Medications      Instructions Start Date   docusate sodium 100 MG capsule   100 mg, Oral, 2 Times Daily      HYDROcodone-acetaminophen 7.5-325 MG per tablet  Commonly known as: NORCO  Notes to patient: NEXT DOSE AVAILABLE TO YOU AT 2:30 PM 12/18/2020   Take 1-2 tabs po Q 4-6 hours prn pain.  Not to exceed 6 tabs per day.      ondansetron 4 MG tablet  Commonly known as: ZOFRAN   4 mg, Oral, Every 6 Hours PRN         Continue These Medications      Instructions Start Date   omeprazole 20 MG capsule  Commonly known as: PrilOSEC   20 mg, Oral, Daily             Discharge Diet: regular    Activity at Discharge: as tolerated    Discharge Instructions:   1)  Patient is to continue with physical therapy exercises daily and continue working with the physical therapist as ordered.  2)  Continue to follow precautions as instructed.   3)  Patient is instructed to continue use of the sling except when  performing their physical therapy exercising and while dressing or showering.  4)  Continue to ice regularly. Patient also instructed on incentive spirometer during hospitalization and encouraged to continue to use at home regularly.   5)  The dressing should be left in place. If waterproof dressing is intact the patient may shower immediately following discharge. If the dressing becomes disloged or saturated it should be changed and patient must wait until POD #5 to shower. If dressing is changed, apply dry sterile dressing after showering.  6)  Follow up appointment in 2 weeks - patient to call the office at [phone] to schedule.     Follow-up Appointments  2 weeks with Dr Tyson Perez, APRN  12/18/20  11:35 EST

## 2020-12-21 ENCOUNTER — TRANSITIONAL CARE MANAGEMENT TELEPHONE ENCOUNTER (OUTPATIENT)
Dept: CALL CENTER | Facility: HOSPITAL | Age: 60
End: 2020-12-21

## 2020-12-21 NOTE — OUTREACH NOTE
Call Center TCM Note      Responses   [facility] patient discharged from?  [place]   Does the patient have one of the following disease processes/diagnoses(primary or secondary)?  Total Joint Replacement   Joint surgery performed?  Shoulder   TCM attempt successful?  Yes   Call start time  1112   Call end time  1116   Has the patient been back in either the hospital or Emergency Department since discharge?  No   General alerts for this patient  call number    Discharge diagnosis  TOTAL SHOULDER ARTHROPLASTY    Is patient permission given to speak with other caregiver?  Yes   Person spoke with today (if not patient) and relationship  wife   Does the patient have all medications related to this admission filled (includes all antibiotics, pain medications, etc.)  Yes   Is the patient taking all medications as directed (includes completed medication regime)?  Yes   Is the patient able to teach back alternate methods of pain control?  Ice, Shoulder-elevate above heart/ keep in sling as advised, Reposition, Correct alignment, Short, frequent activity   Does the patient have a follow up appointment with their surgeon?  Yes   Has the patient kept scheduled appointments due by today?  N/A   Comments  Has post op with surgeon on 12/30/2020, declines PCP appt at present time.    Has home health visited the patient within 72 hours of discharge?  N/A   Has the patient began therapy sessions (either in the home or as an out patient)?  No   Does the patient have a wound vac in place?  N/A   Has the patient fallen since discharge?  No   Did the patient receive a copy of their discharge instructions?  Yes   Nursing interventions  Reviewed instructions with patient   What is the patient's perception of their functional status since discharge?  Improving   Is the patient able to teach back signs and symptoms of infection?  Temp >100.4 for 24h or longer, Shortness of breath or chest pain, Severe discomfort or pain,  Blisters around incision, Incisional drainage, Increased swelling or redness around incision (not associated with surgical edema)   Is the patient able to teach back how to prevent infection?  Check incision daily, No tub baths, hot tub or swimming, Shower only as directed by surgeon, Keep incision covered if drainage, Wash hands before and after touching incision   Is the patient able to teach back home safety measures?  Ability to shower   Did the patient implement home safety suggestions from pre-surgery classes if attended?  N/A   If the patient is a current smoker, are they able to teach back resources for cessation?  Not a smoker   Is the patient/caregiver able to teach back the hierarchy of who to call/visit for symptoms/problems? PCP, Specialist, Home health nurse, Urgent Care, ED, 911  Yes   TCM call completed?  Yes          Magan Cutler RN    12/21/2020, 11:17 EST

## 2020-12-23 ENCOUNTER — TELEPHONE (OUTPATIENT)
Dept: ORTHOPEDIC SURGERY | Facility: CLINIC | Age: 60
End: 2020-12-23

## 2020-12-23 NOTE — TELEPHONE ENCOUNTER
Patient c/o rash scabbing/itching all over chest and back. Rash started on left shoulder around the 19th 20th   Patient stopped HYDROcodone-acetaminophen (NORCO) 7.5-325 MG per tablet Sunday or Monday. Please advise.

## 2020-12-23 NOTE — TELEPHONE ENCOUNTER
I called and spoke with his wife.  She tells me he is having a lot of itching around the bandage.  I am concerned that he may have a reaction to the adhesive.  I recommended that they remove the bandage and replace that with gauze and paper tape.  I told them to  some Benadryl as well and try that.  If the symptoms persist, I told him to notify us.  Also suggested that they may want to consider talking to his PCP to see if there could be another etiology at play here.

## 2020-12-30 ENCOUNTER — OFFICE VISIT (OUTPATIENT)
Dept: ORTHOPEDIC SURGERY | Facility: CLINIC | Age: 60
End: 2020-12-30

## 2020-12-30 VITALS — WEIGHT: 145 LBS | HEIGHT: 65 IN | TEMPERATURE: 97.2 F | BODY MASS INDEX: 24.16 KG/M2

## 2020-12-30 DIAGNOSIS — Z09 SURGERY FOLLOW-UP: Primary | ICD-10-CM

## 2020-12-30 PROCEDURE — 73030 X-RAY EXAM OF SHOULDER: CPT | Performed by: NURSE PRACTITIONER

## 2020-12-30 PROCEDURE — 99024 POSTOP FOLLOW-UP VISIT: CPT | Performed by: NURSE PRACTITIONER

## 2020-12-30 NOTE — PROGRESS NOTES
"Ashwin Anand : 1960 MRN: [id number] DATE: 2020    DIAGNOSIS:  2 week follow up left shoulder arthroplasty      SUBJECTIVE:  Patient returns today for 2 week follow up of left shoulder replacement. Patient reports doing well with no unusual complaints.      OBJECTIVE:    Temp 97.2 °F (36.2 °C)   Ht 165.1 cm (65\")   Wt 65.8 kg (145 lb)   BMI 24.13 kg/m²     Exam:  The incision is well approximated.  No erythema or drainage. Mild hematoma noted about the incision.  Shoulder moves fluidly with pendulums.  The arm is soft and nontender.  Intact motor and sensory function in the lower arm and hand.  Palpable radial pulse.    DIAGNOSTIC STUDIES    Xrays: AP and scapular Y views of the left shoulder are ordered and reviewed for evaluation of the recent shoulder replacement.  The x-rays demonstrate a well positioned, well aligned replacement without complicating factors noted.  In comparison with previous films, there has been no change.    ASSESSMENT: 2 week follow up left shoulder replacement.    PLAN:   1.  Begin PT per protocol--prescription given along with 2 copies of my protocol.  2.  Continue sling until next visit.  3.  Counseled patient about appropriate activity modifications and restrictions, including no driving at this point.  4.  We discussed the need for prophylactic antibiotics for dental procedures.   5.  Follow up with Dr. Mehta in 4 weeks.    Kirsten Perez, MAILE     2020      "

## 2021-01-04 ENCOUNTER — HOSPITAL ENCOUNTER (OUTPATIENT)
Dept: PHYSICAL THERAPY | Facility: HOSPITAL | Age: 61
Setting detail: THERAPIES SERIES
Discharge: HOME | End: 2021-01-04

## 2021-01-04 DIAGNOSIS — Z96.612 STATUS POST REVERSE TOTAL REPLACEMENT OF LEFT SHOULDER: Primary | ICD-10-CM

## 2021-01-04 PROCEDURE — 97161 PT EVAL LOW COMPLEX 20 MIN: CPT

## 2021-01-04 NOTE — THERAPY EVALUATION
Outpatient Physical Therapy Ortho Initial Evaluation   [facility]     Patient Name: Ashwin Anand  : 1960  MRN: [id number]  Today's Date: 2021      Visit Date: 2021    Patient Active Problem List   Diagnosis   • Schizophrenia, schizo-affective (CMS/HCC)   • COPD (chronic obstructive pulmonary disease) (CMS/HCC)   • Gastroesophageal reflux disease without esophagitis   • Pain   • Shoulder arthritis        Past Medical History:   Diagnosis Date   • Arthritis    • COPD (chronic obstructive pulmonary disease) (CMS/HCC)    • GERD (gastroesophageal reflux disease)    • Left shoulder pain    • Schizophrenia, schizo-affective (CMS/HCC)     PATIENT DENIES THIS IN HIS MEDICAL HISTORY        Past Surgical History:   Procedure Laterality Date   • ANTERIOR CERVICAL DISCECTOMY W/ FUSION     • APPENDECTOMY     • FINGER SURGERY Right     TENDON AND NERVE REPAIR   • KNEE SURGERY Right     AS A CHILD   • LUMBAR DISCECTOMY     • TOTAL SHOULDER ARTHROPLASTY W/ DISTAL CLAVICLE EXCISION Left 2020    Procedure: TOTAL SHOULDER REVERSE ARTHROPLASTY;  Surgeon: Ashutosh Mehta MD;  Location: [facility];  Service: Orthopedics;  Laterality: Left;       Visit Dx:     ICD-10-CM ICD-9-CM   1. Status post reverse total replacement of left shoulder  Z96.612 V43.61         Patient History     Row Name 21 1000             History    Chief Complaint  Difficulty with daily activities;Joint stiffness;Muscle tenderness;Muscle weakness;Pain;Tightness  -LN      Type of Pain  Shoulder pain left  -LN      Date Current Problem(s) Began  20  -LN      Brief Description of Current Complaint  Patient s/p left reverse TSA on 2020. No previous injury or surgeries left shoulder. With history of severe degenerative arthritis left shoulder. Spent 1 night in hospital. wears sling PRN, not at home.   -LN      Previous treatment for THIS PROBLEM  Surgery  -LN      Surgery Date:  20  -LN      Patient/Caregiver  Goals  Relieve pain;Improve mobility;Improve strength;Know what to do to help the symptoms  -LN      Hand Dominance  left-handed  -LN      Occupation/sports/leisure activities  installs windshields; . Off presently; thinking about retiring. He likes to walk his big dog.   -LN      Patient seeing anyone else for problem(s)?  ortho  -LN      How has patient tried to help current problem?  ice pack; tylenol PRN;   -LN      What clinical tests have you had for this problem?  X-ray  -LN      Results of Clinical Tests  severe degenerative arthritis left shoulder prior to surgery  -LN      Related/Recent Hospitalizations  Yes  -LN      Date of Hospitalization  12/17/20  -LN      Surgery/Hospitalization  s/p left reverse TSA  -LN      History of Previous Related Injuries  none reported  -LN         Pain     Pain Location  Shoulder left  -LN      Pain at Present  3  -LN      Pain at Best  2  -LN      Pain at Worst  8  -LN      Pain Frequency  Constant/continuous  -LN      Pain Description  Sharp;Aching occasional sharp pain- lasts a couple seconds   -LN      What Performance Factors Make the Current Problem(s) WORSE?  exercises; ROM; lying on left side  -LN      What Performance Factors Make the Current Problem(s) BETTER?  rest; ice; tylenol  -LN      Tolerance Time- Lying  can't lie on left side  -LN      Is your sleep disturbed?  Yes normally sleeps on left side  -LN      Is medication used to assist with sleep?  No  -LN      What position do you sleep in?  Supine  -LN      Difficulties at work?  off presently; thinking of retiring  -LN      Difficulties with ADL's?  yes  -LN      Difficulties with recreational activities?  likes to walk his big dog  -LN         Fall Risk Assessment    Any falls in the past year:  No  -LN         Services    Prior Rehab/Home Health Experiences  No  -LN      Are you currently receiving Home Health services  No  -LN      Do you plan to receive Home Health services in the  "near future  No  -LN         Daily Activities    Primary Language  English  -LN      How does patient learn best?  Other (comment) not specified  -LN      Teaching needs identified  Home Exercise Program;Management of Condition;Other (comment) Risks and benefits of treatment explained to patient  -LN      Patient is concerned about/has problems with  Bed Mobility;Coordination;Difficulty with self care (i.e. bathing, dressing, toileting:;Flexibility;Grasping objects lifting;Performing home management (household chores, shopping, care of dependents);Performing job responsibilities/community activities (work, school,;Performing sports, recreation, and play activities;Reaching over head;Repetitive movements of the hand, arm, shoulder;Writing/grasping items with hand(s)  -LN      Does patient have problems with the following?  None;Other (comment) none reported  -LN      Barriers to learning  None  -LN      Pt Participated in POC and Goals  Yes  -LN         Safety    Are you being hurt, hit, or frightened by anyone at home or in your life?  No  -LN      Are you being neglected by a caregiver  No  -LN      Have you had any of the following issues with  N/A none reported  -LN        User Key  (r) = Recorded By, (t) = Taken By, (c) = Cosigned By    Initials Name Provider Type    Sunni Randolph, PT Physical Therapist          PT Ortho     Row Name 01/04/21 1000       Subjective Comments    Subjective Comments  Patient c/o aching in left shoulder and soreness in muscles in upper arm (deltoid). \"I have been doing my exercises 5-6 times a day like the PT at the hospital showed me.\" \"I use an ice pack and that helps.\"   -LN       Precautions and Contraindications    Precautions/Limitations  shoulder precautions s/p reverse TSA left  -LN    Precautions  no shoulder extension or IR of left shoulder (no reaching behind back) and no AROM left shoulder at this time  -LN       Subjective Pain    Able to rate subjective " pain?  yes  -LN    Pre-Treatment Pain Level  3  -LN    Post-Treatment Pain Level  3  -LN       Posture/Observations    Forward Head  Mild  -LN    Rounded Shoulders  Bilateral:;Moderate  -LN    Observations  Incision healing;Edema  -LN    Posture/Observations Comments  minimal edema noted incisional area; area of scabbing noted incision laterally. No drainage or open areas noted.  No sling on today; patient reports he wears PRN.  -LN       Biceps/Labral Special Tests    Biceps/Labral Special Tests Comments  No special tests done secondary to recent shoulder surgery  -LN       Shoulder Girdle Palpation    Supraspinatus Insertion  Left:;Tender  -LN    Deltoid  Left:;Tender  -LN    Upper Trap  Left:;Tender;Guarded/taut  -LN       Left Upper Ext    Lt Shoulder Abduction AROM  NT  -LN    Lt Shoulder Abduction PROM  90 degrees scaption per protocol restrictions  -LN    Lt Shoulder Extension AROM  NT  -LN    Lt Shoulder Extension PROM  NT per protocol  -LN    Lt Shoulder Flexion AROM  NT  -LN    Lt Shoulder Flexion PROM  90 degrees per protocol restrictions  -LN    Lt Shoulder External Rotation AROM  NT  -LN    Lt Shoulder External Rotation PROM  20 degrees; elbow at side per protocol restrictions  -LN    Lt Shoulder Internal Rotation AROM  NT  -LN    Lt Shoulder Internal Rotation PROM  NT per protocol  -LN    Lt Elbow Extension/Flexion AROM  WFL  -LN    Lt Elbow Extension/Flexion PROM  WNL  -LN    Lt Elbow Supination AROM  WFL with tightness at end-range  -LN    Lt Elbow Supination PROM  WFL with tightness at end-range  -LN    Lt Elbow Pronation AROM  WNL  -LN    Lt Elbow Pronation PROM  WNL  -LN    Lt Wrist Flexion AROM  WFL  -LN    Lt Wrist Extension AROM  WFL  -LN       MMT (Manual Muscle Testing)    General MMT Comments  no MMT done left UE secondary to recent shoulder surgery  -LN       Sensation    Sensation WNL?  WNL  -LN    Light Touch  No apparent deficits  -LN    Additional Comments  no c/o any N/T left arm  -LN        Upper Extremity Flexibility    Upper Trapezius  Left:;Mildly limited  -LN    Pect Minor  Left:;Mildly limited  -LN    Pect Major  Left:;Mildly limited  -LN       Gait/Stairs (Locomotion)    Comment (Gait/Stairs)  Patient independent with all functional mobility and gait; no AD.  -LN      User Key  (r) = Recorded By, (t) = Taken By, (c) = Cosigned By    Initials Name Provider Type    Sunni Randolph, PT Physical Therapist                      Therapy Education  Education Details: Patient to continue with pendulum exercises 3 x day as well as ROM for elbow and wrist and ball squeeze for  strengthening. Patient had been working on active shoulder flexion/extension stating that the PT at the hospital instructed him to do this- instructed him to stop this immediately and to not do per surgical restrictions.  Patient to use MH/CP PRN. Advised patient to not do any AROM with left shoulder at this time and to avoid any extension and adduction with IR ( do not put left arm behind his back/tuck in shirt).  Patient to wear sling per MD, & advised him to wear it when he is going to be out in public around a crowd for shoulder protection.  Given: HEP, Symptoms/condition management, Pain management  Program: New  How Provided: Verbal, Demonstration, Written  Provided to: Patient  Level of Understanding: Teach back education performed, Verbalized, Demonstrated     PT OP Goals     Row Name 01/04/21 1000          PT Short Term Goals    STG Date to Achieve  01/18/21  -LN     STG 1  Patient to verbally report decreased left shoulder pain to <3/10 with exercises and everyday home activities.   -LN     STG 2  Patient independent with initial HEP (per protocol)  issued by therapist.   -LN     STG 3  Patient to have improved left shoulder PROM to 120 degrees flexion and abduction/scaption.   -LN     STG 4  Patient able to perform 10 reps AAROM exercises without any c/o increased left shoulder pain.   -LN        Long  Term Goals    LTG Date to Achieve  02/01/21  -LN     LTG 1  Patient independent with more advanced HEP (per protocol) issued by therapist, including submax isometrics.  -LN     LTG 2  Patient to have improved left shoulder P/AAROM to 140 degrees flexion and abduction/scaption, & 40 degrees IR & ER.   -LN     LTG 3  Patient to verbally report decreased left shoulder pain to 0-1/10 with ADLs and everyday home activities.   -LN     LTG 4  Patient able to begin to use left arm for routine ADLs, including feeding, dressing, and bathing (but no shoulder extension) with left shoulder pain no > than 2/10.   -LN        Time Calculation    PT Goal Re-Cert Due Date  02/01/21  -LN       User Key  (r) = Recorded By, (t) = Taken By, (c) = Cosigned By    Initials Name Provider Type    Sunni Randolph, PT Physical Therapist          PT Assessment/Plan     Row Name 01/04/21 1000          PT Assessment    Functional Limitations  Limitation in home management;Limitations in community activities;Limitations in functional capacity and performance;Performance in leisure activities;Performance in self-care ADL;Performance in work activities  -LN     Impairments  Impaired flexibility;Joint mobility;Muscle strength;Pain;Posture;Range of motion  -LN     Assessment Comments  Patient presents 2.5 weeks s/p reverse TSA left shoulder with mild shoulder pain, decreased left shoulder ROM, decreased left shoulder/UE strength, and decreased use of left UE with ADLs.  He tolerated PROM very well today within ROM restrictions per protocol.   -LN     Please refer to paper survey for additional self-reported information  Yes  -LN     Rehab Potential  Good  -LN     Patient/caregiver participated in establishment of treatment plan and goals  Yes  -LN     Patient would benefit from skilled therapy intervention  Yes  -LN        PT Plan    PT Frequency  2x/week  -LN     Predicted Duration of Therapy Intervention (PT)  8-10 weeks  -LN     Planned  "CPT's?  PT EVAL LOW COMPLEXITY: 32746;PT THER PROC EA 15 MIN: 16305;PT MANUAL THERAPY EA 15 MIN: 49566;PT HOT OR COLD PACK TREAT MCARE;PT ELECTRICAL STIM UNATTEND:   -LN     Physical Therapy Interventions (Optional Details)  home exercise program;joint mobilization;manual therapy techniques;modalities;patient/family education;postural re-education;ROM (Range of Motion);strengthening;stretching;taping  -LN     PT Plan Comments  See patient 2 x week for P/AA/AROM and therapeutic exercise with HEP, per protocol; manual therapy; modalties PRN (IFC/MH/CP); patient education; kinesiotape PRN.   -LN       User Key  (r) = Recorded By, (t) = Taken By, (c) = Cosigned By    Initials Name Provider Type    Sunni Randolph, PT Physical Therapist          Modalities     Row Name 01/04/21 1000             Ice    Patient denies application of Ice  Yes  -LN      Patient reports will apply ice at home to involved area  Yes  -LN        User Key  (r) = Recorded By, (t) = Taken By, (c) = Cosigned By    Initials Name Provider Type    Sunni Randolph, PT Physical Therapist        OP Exercises     Row Name 01/04/21 1000             Precautions    Existing Precautions/Restrictions  shoulder  -LN         Subjective Comments    Subjective Comments  Patient c/o aching in left shoulder and soreness in muscles in upper arm (deltoid). \"I have been doing my exercises 5-6 times a day like the PT at the hospital showed me.\" \"I use an ice pack and that helps.\"   -LN         Subjective Pain    Able to rate subjective pain?  yes  -LN      Pre-Treatment Pain Level  3  -LN      Post-Treatment Pain Level  3  -LN         Exercise 1    Exercise Name 1  Codman's pendulum cw & ccw, & side to side/lateral  -LN      Reps 1  20 each  -LN      Additional Comments  reviewed HEP  -LN         Exercise 2    Exercise Name 2  active elbow flexion & extension  -LN      Reps 2  10  -LN      Additional Comments  reviewed HEP  -LN         Exercise " 3    Exercise Name 3  active forearm supination and pronation  -LN      Reps 3  10  -LN      Additional Comments  reviewed HEP  -LN         Exercise 4    Exercise Name 4  active wrist ROM  -LN      Reps 4  10  -LN      Additional Comments  reviewed HEP  -LN        User Key  (r) = Recorded By, (t) = Taken By, (c) = Cosigned By    Initials Name Provider Type    Sunni Randolph, PT Physical Therapist           Manual Rx (last 36 hours)      Manual Treatments     Row Name 01/04/21 1000             Manual Rx 1    Manual Rx 1 Location  Left shoulder PROM with patient supine.  -LN      Manual Rx 1 Type  PROM for flexion, abd/scaption, and ER  -LN      Manual Rx 1 Duration  limited to 90 degrees flexion and abd/scaption and ER 20 degrees per protocol.   -LN        User Key  (r) = Recorded By, (t) = Taken By, (c) = Cosigned By    Initials Name Provider Type    Sunni Randolph, PT Physical Therapist                      Outcome Measure Options: Quick DASH  Quick DASH  Open a tight or new jar.: Mild Difficulty  Do heavy household chores (e.g., wash walls, wash floors): Unable  Carry a shopping bag or briefcase: Unable  Wash your back: Mild Difficulty  Use a knife to cut food: Mild Difficulty  Recreational activities in which you take some force or impact through your arm, should or hand (e.g. golf, hammering, tennis, etc.): Unable  During the past week, to what extent has your arm, shoulder, or hand problem interfered with your normal social activites with family, friends, neighbors or groups?: Slightly  During the past week, were you limited in your work or other regular daily activities as a result of your arm, shoulder or hand problem?: Very limited  Arm, Shoulder, or hand pain: Moderate  Tingling (pins and needles) in your arm, shoulder, or hand: Mild  During the past week, how much difficulty have you had sleeping because of the pain in your arm, shoulder or hand?: Mild Difficulty  Number of  Questions Answered: 11  Quick DASH Score: 52.27  Work Module (Optional)  Using your usual technique for your work?: Unable()  Doing your usual work because of arm, shoulder or hand pain?: Unable  Doing your work as well as you would like?: Unable  Spending your usual amount of time doing your work?: Unable  Work Module Score: 100         Time Calculation:     Start Time: 1006  Stop Time: 1045  Time Calculation (min): 39 min     Therapy Charges for Today     Code Description Service Date Service Provider Modifiers Qty    82462298496 HC PT EVAL LOW COMPLEXITY 2 1/4/2021 Sunni Oliveira, PT GP 1          PT G-Codes  Outcome Measure Options: Quick DASH  Quick DASH Score: 52.27         Sunni Oliveira, PT  1/4/2021

## 2021-01-07 ENCOUNTER — HOSPITAL ENCOUNTER (OUTPATIENT)
Dept: PHYSICAL THERAPY | Facility: HOSPITAL | Age: 61
Setting detail: THERAPIES SERIES
Discharge: HOME | End: 2021-01-07

## 2021-01-07 DIAGNOSIS — Z96.612 STATUS POST REVERSE TOTAL REPLACEMENT OF LEFT SHOULDER: Primary | ICD-10-CM

## 2021-01-07 PROCEDURE — 97140 MANUAL THERAPY 1/> REGIONS: CPT

## 2021-01-07 PROCEDURE — 97110 THERAPEUTIC EXERCISES: CPT

## 2021-01-07 NOTE — THERAPY TREATMENT NOTE
Outpatient Physical Therapy Ortho Treatment Note   [place]     Patient Name: Ashwin Anand  : 1960  MRN: [id number]  Today's Date: 2021      Visit Date: 2021    Visit Dx:    ICD-10-CM ICD-9-CM   1. Status post reverse total replacement of left shoulder  Z96.612 V43.61       Patient Active Problem List   Diagnosis   • Schizophrenia, schizo-affective (CMS/HCC)   • COPD (chronic obstructive pulmonary disease) (CMS/HCC)   • Gastroesophageal reflux disease without esophagitis   • Pain   • Shoulder arthritis        Past Medical History:   Diagnosis Date   • Arthritis    • COPD (chronic obstructive pulmonary disease) (CMS/HCC)    • GERD (gastroesophageal reflux disease)    • Left shoulder pain    • Schizophrenia, schizo-affective (CMS/HCC)     PATIENT DENIES THIS IN HIS MEDICAL HISTORY        Past Surgical History:   Procedure Laterality Date   • ANTERIOR CERVICAL DISCECTOMY W/ FUSION     • APPENDECTOMY     • FINGER SURGERY Right     TENDON AND NERVE REPAIR   • KNEE SURGERY Right     AS A CHILD   • LUMBAR DISCECTOMY     • TOTAL SHOULDER ARTHROPLASTY W/ DISTAL CLAVICLE EXCISION Left 2020    Procedure: TOTAL SHOULDER REVERSE ARTHROPLASTY;  Surgeon: Ashutosh Mehta MD;  Location: [facility];  Service: Orthopedics;  Laterality: Left;       PT Ortho     Row Name 21 0800       Subjective Comments    Subjective Comments  Pt brought his HEP as issued by hospital for review - states he has been trying to do ex's correctly since initial eval; pt states his shoulder has been more stiff since changing his ex's  -MH       Subjective Pain    Able to rate subjective pain?  yes  -    Pre-Treatment Pain Level  0  -MH    Post-Treatment Pain Level  0  -MH      User Key  (r) = Recorded By, (t) = Taken By, (c) = Cosigned By    Initials Name Provider Type    Bruce Dillon, PASCUAL Physical Therapy Assistant                      PT Assessment/Plan     Row Name 21 1238          PT Assessment     Assessment Comments  pt cotinues to need cues for HEP to maintain protocol; pt tolerated PROM well, meeting ROM restrictions in all planes without difficulty; pt declined pre and post modalities  -        PT Plan    PT Plan Comments  Cont per POC, progressing per protocol  -       User Key  (r) = Recorded By, (t) = Taken By, (c) = Cosigned By    Initials Name Provider Type     Bruce Rosales, PASCUAL Physical Therapy Assistant            OP Exercises     Row Name 01/07/21 0800             Precautions    Existing Precautions/Restrictions  shoulder  -         Subjective Comments    Subjective Comments  Pt brought his HEP as issued by hospital for review - states he has been trying to do ex's correctly since initial eval; pt states his shoulder has been more stiff since changing his ex's  -         Subjective Pain    Able to rate subjective pain?  yes  -      Pre-Treatment Pain Level  0  -      Post-Treatment Pain Level  0  -         Exercise 1    Exercise Name 1  Codman's pendulum cw & ccw, & side to side/lateral  -      Reps 1  20 each  -      Additional Comments  Cues/demonstration for proper technique  -         Exercise 2    Exercise Name 2  active elbow flexion & extension  -      Reps 2  10  -      Additional Comments  Cues/demonstration to slow ex's and hold for stretch  -         Exercise 3    Exercise Name 3  active forearm supination and pronation  -      Reps 3  10  -      Additional Comments  Cues/demonstration to slow ex's and hold for stretch  -        User Key  (r) = Recorded By, (t) = Taken By, (c) = Cosigned By    Initials Name Provider Type     Bruce Rosales PTA Physical Therapy Assistant                      Manual Rx (last 36 hours)      Manual Treatments     Row Name 01/07/21 0800             Manual Rx 1    Manual Rx 1 Location  Left shoulder PROM with patient supine.  -      Manual Rx 1 Type  PROM for flexion, abd/scaption, and ER  -      Manual Rx 1  Duration  limited to 90 degrees flexion and abd/scaption and ER 20 degrees per protocol.   -        User Key  (r) = Recorded By, (t) = Taken By, (c) = Cosigned By    Initials Name Provider Type     Bruce Rosales PTA Physical Therapy Assistant              Therapy Education  Given: HEP  Program: Reinforced  How Provided: Verbal, Demonstration  Provided to: Patient  Level of Understanding: Teach back education performed, Verbalized, Demonstrated              Time Calculation:   Start Time: 0800  Stop Time: 0840  Time Calculation (min): 40 min  Therapy Charges for Today     Code Description Service Date Service Provider Modifiers Qty    80500013221 HC PT MANUAL THERAPY EA 15 MIN 1/7/2021 Bruce Rosales PTA GP 1    86255209669 HC PT THER PROC EA 15 MIN 1/7/2021 Bruce Rosales PTA GP 1                    Bruce Rosales PTA  1/7/2021

## 2021-01-11 ENCOUNTER — HOSPITAL ENCOUNTER (OUTPATIENT)
Dept: PHYSICAL THERAPY | Facility: HOSPITAL | Age: 61
Setting detail: THERAPIES SERIES
Discharge: HOME | End: 2021-01-11

## 2021-01-11 DIAGNOSIS — Z96.612 STATUS POST REVERSE TOTAL REPLACEMENT OF LEFT SHOULDER: Primary | ICD-10-CM

## 2021-01-11 PROCEDURE — 97140 MANUAL THERAPY 1/> REGIONS: CPT

## 2021-01-11 NOTE — THERAPY TREATMENT NOTE
Outpatient Physical Therapy Ortho Treatment Note   Colleen Calabrese     Patient Name: Ashwin Anand  : 1960  MRN: [id number]  Today's Date: 2021      Visit Date: 2021    Visit Dx:    ICD-10-CM ICD-9-CM   1. Status post reverse total replacement of left shoulder  Z96.612 V43.61       Patient Active Problem List   Diagnosis   • Schizophrenia, schizo-affective (CMS/HCC)   • COPD (chronic obstructive pulmonary disease) (CMS/HCC)   • Gastroesophageal reflux disease without esophagitis   • Pain   • Shoulder arthritis        Past Medical History:   Diagnosis Date   • Arthritis    • COPD (chronic obstructive pulmonary disease) (CMS/HCC)    • GERD (gastroesophageal reflux disease)    • Left shoulder pain    • Schizophrenia, schizo-affective (CMS/HCC)     PATIENT DENIES THIS IN HIS MEDICAL HISTORY        Past Surgical History:   Procedure Laterality Date   • ANTERIOR CERVICAL DISCECTOMY W/ FUSION     • APPENDECTOMY     • FINGER SURGERY Right     TENDON AND NERVE REPAIR   • KNEE SURGERY Right     AS A CHILD   • LUMBAR DISCECTOMY     • TOTAL SHOULDER ARTHROPLASTY W/ DISTAL CLAVICLE EXCISION Left 2020    Procedure: TOTAL SHOULDER REVERSE ARTHROPLASTY;  Surgeon: Ashutosh Mehta MD;  Location: [facility];  Service: Orthopedics;  Laterality: Left;       PT Ortho     Row Name 21 0900       Precautions and Contraindications    Precautions/Limitations  shoulder precautions  -LN    Precautions  no shoulder extension or IR of left shoulder (no reaching behind back) and no AROM left shoulder at this time  -LN       Subjective Pain    Able to rate subjective pain?  yes  -LN    Pre-Treatment Pain Level  2 -2/10  -LN       Left Upper Ext    Lt Shoulder Abduction PROM  133 degrees scaption restricted to 140 degrees per protocol  -LN    Lt Shoulder Flexion PROM  135 degrees restricted to 140 degrees per protocol  -LN    Lt Shoulder External Rotation PROM  20 degrees; elbow at side per protocol restrictions  " -LN    Lt Shoulder Internal Rotation PROM  NT per protocol  -LN      User Key  (r) = Recorded By, (t) = Taken By, (c) = Cosigned By    Initials Name Provider Type    Sunni Randolph, PT Physical Therapist                      PT Assessment/Plan     Row Name 01/11/21 0900          PT Assessment    Assessment Comments  Patient with good tolerance to PROM and able to increase  passive range of flexion and scaption today per protocol and he tolerated well. He is doing well with HEP but still needs cueing to maintain all ROM restrictions per protocol.   -LN        PT Plan    PT Frequency  2x/week  -LN     PT Plan Comments  Cont per POC, progressing per protocol; modalities PRN (MH/CP/IFC); manual therapy and patient education. Next visit, begin supine AAROM cane exercises for flexion and ER as tolerated and per protocol ROM restrictions.  -LN       User Key  (r) = Recorded By, (t) = Taken By, (c) = Cosigned By    Initials Name Provider Type    Sunni Randolph, PT Physical Therapist          Modalities     Row Name 01/11/21 0900             Precautions    Existing Precautions/Restrictions  shoulder  -LN         Subjective Comments    Subjective Comments  He reports his shoulder feels tight and has a little pain in back of shoulder, but \"it's good.\" He reports tightness ramon first thing in the mornings. \"I took a warm shower this morning and have already done my exercises a couple times this morning.\"   -LN         Moist Heat    Patient denies application of MH  Yes  -LN         Ice    Patient denies application of Ice  Yes  -LN      Patient reports will apply ice at home to involved area  Yes  -LN        User Key  (r) = Recorded By, (t) = Taken By, (c) = Cosigned By    Initials Name Provider Type    Sunni Randolph, PT Physical Therapist        OP Exercises     Row Name 01/11/21 0900             Precautions    Existing Precautions/Restrictions  shoulder  -LN         Subjective Comments    " "Subjective Comments  He reports his shoulder feels tight and has a little pain in back of shoulder, but \"it's good.\" He reports tightness ramon first thing in the mornings. \"I took a warm shower this morning and have already done my exercises a couple times this morning.\"   -LN         Subjective Pain    Able to rate subjective pain?  yes  -LN      Pre-Treatment Pain Level  2 1-2/10  -LN         Exercise 1    Exercise Name 1  Verbally reviewed HEP.   -LN        User Key  (r) = Recorded By, (t) = Taken By, (c) = Cosigned By    Initials Name Provider Type    Sunni Randolph, PT Physical Therapist                      Manual Rx (last 36 hours)      Manual Treatments     Row Name 01/11/21 0900             Manual Rx 1    Manual Rx 1 Location  Left shoulder PROM with patient supine.  -LN      Manual Rx 1 Type  PROM for flexion, abd/scaption, and ER  -LN      Manual Rx 1 Duration  limited to 140 degrees flexion and abd/scaption and ER 20 degrees per protocol.   -LN        User Key  (r) = Recorded By, (t) = Taken By, (c) = Cosigned By    Initials Name Provider Type    Sunni Randolph, PT Physical Therapist              Therapy Education  Education Details: Reviewed his shoulder restrictions, including no shoulder extension and no shoulder IR; no putting arm behind his back and no AROM of left shoulder at this time.  Given: HEP, Symptoms/condition management  Program: Reinforced  How Provided: Verbal  Provided to: Patient  Level of Understanding: Verbalized              Time Calculation:   Start Time: 0902  Stop Time: 0925  Time Calculation (min): 23 min  Therapy Charges for Today     Code Description Service Date Service Provider Modifiers Qty    43958556768  PT MANUAL THERAPY EA 15 MIN 1/11/2021 Sunni Oliveira, PT GP 1                    Sunni Oliveira, PT  1/11/2021     "

## 2021-01-13 ENCOUNTER — HOSPITAL ENCOUNTER (OUTPATIENT)
Dept: PHYSICAL THERAPY | Facility: HOSPITAL | Age: 61
Setting detail: THERAPIES SERIES
Discharge: HOME | End: 2021-01-13

## 2021-01-13 DIAGNOSIS — Z96.612 STATUS POST REVERSE TOTAL REPLACEMENT OF LEFT SHOULDER: Primary | ICD-10-CM

## 2021-01-13 PROCEDURE — 97110 THERAPEUTIC EXERCISES: CPT

## 2021-01-13 PROCEDURE — 97140 MANUAL THERAPY 1/> REGIONS: CPT

## 2021-01-13 NOTE — THERAPY TREATMENT NOTE
"    Outpatient Physical Therapy Ortho Treatment Note   [place]     Patient Name: Ashwin Anand  : 1960  MRN: [id number]  Today's Date: 2021      Visit Date: 2021    Visit Dx:    ICD-10-CM ICD-9-CM   1. Status post reverse total replacement of left shoulder  Z96.612 V43.61       Patient Active Problem List   Diagnosis   • Schizophrenia, schizo-affective (CMS/HCC)   • COPD (chronic obstructive pulmonary disease) (CMS/HCC)   • Gastroesophageal reflux disease without esophagitis   • Pain   • Shoulder arthritis        Past Medical History:   Diagnosis Date   • Arthritis    • COPD (chronic obstructive pulmonary disease) (CMS/HCC)    • GERD (gastroesophageal reflux disease)    • Left shoulder pain    • Schizophrenia, schizo-affective (CMS/HCC)     PATIENT DENIES THIS IN HIS MEDICAL HISTORY        Past Surgical History:   Procedure Laterality Date   • ANTERIOR CERVICAL DISCECTOMY W/ FUSION     • APPENDECTOMY     • FINGER SURGERY Right     TENDON AND NERVE REPAIR   • KNEE SURGERY Right     AS A CHILD   • LUMBAR DISCECTOMY     • TOTAL SHOULDER ARTHROPLASTY W/ DISTAL CLAVICLE EXCISION Left 2020    Procedure: TOTAL SHOULDER REVERSE ARTHROPLASTY;  Surgeon: Ashutosh Mehta MD;  Location: [facility];  Service: Orthopedics;  Laterality: Left;       PT Ortho     Row Name 21 0800       Subjective Comments    Subjective Comments  pt reports he continues to do well; \"I\"ve already done my ex's a couple of times this morning.\"  Pt declines heat before stretches  -MH       Precautions and Contraindications    Precautions/Limitations  shoulder precautions  -MH    Precautions  no shoulder extension or IR of left shoulder (no reaching behind back) and no AROM left shoulder at this time  -      User Key  (r) = Recorded By, (t) = Taken By, (c) = Cosigned By    Initials Name Provider Type    Bruce Dillon, PTA Physical Therapy Assistant                      PT Assessment/Plan     Row Name 21 " "0859          PT Assessment    Assessment Comments  pt continues to tolerate PROM well; able to add cane ex's with minimal complaints of discomfort - instruction for keeping within protocol  -        PT Plan    PT Plan Comments  Cont per POC, progressing as tolerated, per protocol  -       User Key  (r) = Recorded By, (t) = Taken By, (c) = Cosigned By    Initials Name Provider Type    Bruce Dillon PTA Physical Therapy Assistant            OP Exercises     Row Name 01/13/21 0800             Subjective Comments    Subjective Comments  pt reports he continues to do well; \"I\"ve already done my ex's a couple of times this morning.\"  Pt declines heat before stretches  -         Exercise 1    Exercise Name 1  supine: cane flex  -      Cueing 1  Verbal;Tactile;Demo  -      Reps 1  10  -MH      Time 1  140 degree limit  -         Exercise 2    Exercise Name 2  supine: cane ER  -      Cueing 2  Verbal;Tactile;Demo  -      Reps 2  10  -MH      Time 2  20 degree limit  -MH         Exercise 3    Exercise Name 3  Verbal review of HEP  -        User Key  (r) = Recorded By, (t) = Taken By, (c) = Cosigned By    Initials Name Provider Type     Bruce Rosales PTA Physical Therapy Assistant                      Manual Rx (last 36 hours)      Manual Treatments     Row Name 01/13/21 0800             Manual Rx 1    Manual Rx 1 Location  Left shoulder PROM with patient supine.  -      Manual Rx 1 Type  PROM for flexion, abd/scaption, and ER  -      Manual Rx 1 Duration  limited to 140 degrees flexion and abd/scaption and ER 20 degrees per protocol.   -        User Key  (r) = Recorded By, (t) = Taken By, (c) = Cosigned By    Initials Name Provider Type     Bruce Rosales PTA Physical Therapy Assistant              Therapy Education  Education Details: written instruction of cane ex's issued and reviewed  Given: HEP, Symptoms/condition management  Program: New, Reinforced  How Provided: Verbal, " Demonstration, Written  Provided to: Patient  Level of Understanding: Teach back education performed, Verbalized, Demonstrated              Time Calculation:   Start Time: 0800  Stop Time: 0822  Time Calculation (min): 22 min  Therapy Charges for Today     Code Description Service Date Service Provider Modifiers Qty    61624163420 HC PT THER PROC EA 15 MIN 1/13/2021 Bruce Rosales PTA GP 1    51603291155 HC PT MANUAL THERAPY EA 15 MIN 1/13/2021 Bruce Rosales PTA GP 1                    Bruce Rosales PTA  1/13/2021

## 2021-01-20 ENCOUNTER — HOSPITAL ENCOUNTER (OUTPATIENT)
Dept: PHYSICAL THERAPY | Facility: HOSPITAL | Age: 61
Setting detail: THERAPIES SERIES
Discharge: HOME | End: 2021-01-20

## 2021-01-20 DIAGNOSIS — Z96.612 STATUS POST REVERSE TOTAL REPLACEMENT OF LEFT SHOULDER: Primary | ICD-10-CM

## 2021-01-20 PROCEDURE — 97110 THERAPEUTIC EXERCISES: CPT

## 2021-01-20 PROCEDURE — 97140 MANUAL THERAPY 1/> REGIONS: CPT

## 2021-01-20 NOTE — THERAPY TREATMENT NOTE
Outpatient Physical Therapy Ortho Treatment Note   [place]     Patient Name: Ashwin Anand  : 1960  MRN: [id number]  Today's Date: 2021      Visit Date: 2021    Visit Dx:    ICD-10-CM ICD-9-CM   1. Status post reverse total replacement of left shoulder  Z96.612 V43.61       Patient Active Problem List   Diagnosis   • Schizophrenia, schizo-affective (CMS/HCC)   • COPD (chronic obstructive pulmonary disease) (CMS/HCC)   • Gastroesophageal reflux disease without esophagitis   • Pain   • Shoulder arthritis        Past Medical History:   Diagnosis Date   • Arthritis    • COPD (chronic obstructive pulmonary disease) (CMS/HCC)    • GERD (gastroesophageal reflux disease)    • Left shoulder pain    • Schizophrenia, schizo-affective (CMS/HCC)     PATIENT DENIES THIS IN HIS MEDICAL HISTORY        Past Surgical History:   Procedure Laterality Date   • ANTERIOR CERVICAL DISCECTOMY W/ FUSION     • APPENDECTOMY     • FINGER SURGERY Right     TENDON AND NERVE REPAIR   • KNEE SURGERY Right     AS A CHILD   • LUMBAR DISCECTOMY     • TOTAL SHOULDER ARTHROPLASTY W/ DISTAL CLAVICLE EXCISION Left 2020    Procedure: TOTAL SHOULDER REVERSE ARTHROPLASTY;  Surgeon: Ashutosh Mehta MD;  Location: [facility];  Service: Orthopedics;  Laterality: Left;       PT Ortho     Row Name 21 0800       Subjective Comments    Subjective Comments  Pt reports his shoulder continues to do well; compliant with current HEP  -       Precautions and Contraindications    Precautions/Limitations  shoulder precautions  -    Precautions  no shoulder extension or IR of left shoulder (no reaching behind back) and no AROM left shoulder at this time  -      User Key  (r) = Recorded By, (t) = Taken By, (c) = Cosigned By    Initials Name Provider Type     Bruce Rosales, PASCUAL Physical Therapy Assistant                      PT Assessment/Plan     Row Name 21 8213          PT Assessment    Assessment Comments  pt  tolerates PROM well; able to add isometric ex's with no complaints of increased symptoms  -        PT Plan    PT Plan Comments  Cont per POC, progressing per protocol; check response to addition of isometric ex's  -       User Key  (r) = Recorded By, (t) = Taken By, (c) = Cosigned By    Initials Name Provider Type    Bruce Dillon PTA Physical Therapy Assistant            OP Exercises     Row Name 01/20/21 0800             Subjective Comments    Subjective Comments  Pt reports his shoulder continues to do well; compliant with current HEP  -         Exercise 1    Exercise Name 1  supine: cane flex  -MH      Cueing 1  Verbal;Tactile;Demo  -MH      Reps 1  10  -MH      Time 1  140 degree limit  -MH         Exercise 2    Exercise Name 2  supine: cane ER  -MH      Cueing 2  Verbal;Tactile;Demo  -MH      Reps 2  10  -MH      Time 2  20 degree limit  -MH         Exercise 3    Exercise Name 3  Submax isometrics: flex, ABD, Ext  -MH      Cueing 3  Verbal;Tactile;Demo  -MH      Reps 3  10  -MH      Time 3  5 sec  -        User Key  (r) = Recorded By, (t) = Taken By, (c) = Cosigned By    Initials Name Provider Type     Bruce Rosales PTA Physical Therapy Assistant                      Manual Rx (last 36 hours)      Manual Treatments     Row Name 01/20/21 0800             Manual Rx 1    Manual Rx 1 Location  Left shoulder PROM with patient supine.  -      Manual Rx 1 Type  PROM for flexion, abd/scaption, and ER  -      Manual Rx 1 Duration  limited to 140 degrees flexion and abd/scaption and ER 20 degrees per protocol.   -        User Key  (r) = Recorded By, (t) = Taken By, (c) = Cosigned By    Initials Name Provider Type    Bruce Dillon PTA Physical Therapy Assistant              Therapy Education  Education Details: written insruction of isometric ex's issued and reviewed  Given: HEP, Symptoms/condition management  Program: New, Reinforced  How Provided: Verbal, Demonstration, Written  Provided  to: Patient  Level of Understanding: Teach back education performed, Verbalized, Demonstrated              Time Calculation:   Start Time: 0800  Stop Time: 0837  Time Calculation (min): 37 min  Therapy Charges for Today     Code Description Service Date Service Provider Modifiers Qty    87262240218 HC PT MANUAL THERAPY EA 15 MIN 1/20/2021 Bruce Rosales PTA GP 1    80389106050 HC PT THER PROC EA 15 MIN 1/20/2021 Bruce Rosales PTA GP 1                    Bruce Rosales PTA  1/20/2021

## 2021-01-22 ENCOUNTER — HOSPITAL ENCOUNTER (OUTPATIENT)
Dept: PHYSICAL THERAPY | Facility: HOSPITAL | Age: 61
Setting detail: THERAPIES SERIES
Discharge: HOME | End: 2021-01-22

## 2021-01-22 DIAGNOSIS — Z96.612 STATUS POST REVERSE TOTAL REPLACEMENT OF LEFT SHOULDER: Primary | ICD-10-CM

## 2021-01-22 PROCEDURE — 97140 MANUAL THERAPY 1/> REGIONS: CPT

## 2021-01-22 PROCEDURE — 97110 THERAPEUTIC EXERCISES: CPT

## 2021-01-22 NOTE — THERAPY TREATMENT NOTE
Outpatient Physical Therapy Ortho Treatment Note   Colleen Calabrese     Patient Name: Ashwin Anand  : 1960  MRN: [id number]  Today's Date: 2021      Visit Date: 2021    Visit Dx:    ICD-10-CM ICD-9-CM   1. Status post reverse total replacement of left shoulder  Z96.612 V43.61       Patient Active Problem List   Diagnosis   • Schizophrenia, schizo-affective (CMS/HCC)   • COPD (chronic obstructive pulmonary disease) (CMS/HCC)   • Gastroesophageal reflux disease without esophagitis   • Pain   • Shoulder arthritis        Past Medical History:   Diagnosis Date   • Arthritis    • COPD (chronic obstructive pulmonary disease) (CMS/HCC)    • GERD (gastroesophageal reflux disease)    • Left shoulder pain    • Schizophrenia, schizo-affective (CMS/HCC)     PATIENT DENIES THIS IN HIS MEDICAL HISTORY        Past Surgical History:   Procedure Laterality Date   • ANTERIOR CERVICAL DISCECTOMY W/ FUSION     • APPENDECTOMY     • FINGER SURGERY Right     TENDON AND NERVE REPAIR   • KNEE SURGERY Right     AS A CHILD   • LUMBAR DISCECTOMY     • TOTAL SHOULDER ARTHROPLASTY W/ DISTAL CLAVICLE EXCISION Left 2020    Procedure: TOTAL SHOULDER REVERSE ARTHROPLASTY;  Surgeon: Ashutosh Mehta MD;  Location: [facility];  Service: Orthopedics;  Laterality: Left;       PT Ortho     Row Name 21 0800       Subjective Comments    Subjective Comments  pt continues to report minimal discomfort in (L) shoulder; denies increased soreness with addition of isometric ex's  -       Precautions and Contraindications    Precautions/Limitations  shoulder precautions  -    Precautions  no shoulder extension or IR of left shoulder (no reaching behind back) and no AROM left shoulder at this time  -      User Key  (r) = Recorded By, (t) = Taken By, (c) = Cosigned By    Initials Name Provider Type    Bruce Dillon, PASCUAL Physical Therapy Assistant                      PT Assessment/Plan     Row Name 21 0901           PT Assessment    Assessment Comments  continues to do well with PROM and ex's; minimal to no discomfort with progression of ex's  -        PT Plan    PT Plan Comments  Cont per POC, progressing per protocol  -       User Key  (r) = Recorded By, (t) = Taken By, (c) = Cosigned By    Initials Name Provider Type     Bruce Rosales PTA Physical Therapy Assistant            OP Exercises     Row Name 01/22/21 0800             Subjective Comments    Subjective Comments  pt continues to reports minimal discomfort in (L) shoulder; denies increased soreness with addition of isometric ex's  -         Exercise 1    Exercise Name 1  supine: cane flex  -      Cueing 1  Verbal;Tactile;Demo  -      Reps 1  10  -MH      Time 1  140 degree limit  -         Exercise 2    Exercise Name 2  supine: cane ER  -      Cueing 2  Verbal;Tactile;Demo  -      Reps 2  10  -MH      Time 2  20 degree limit  -         Exercise 3    Exercise Name 3  Submax isometrics: flex, ABD, Ext  -      Cueing 3  Verbal;Tactile;Demo  -      Reps 3  15  -MH      Time 3  5 sec  -         Exercise 4    Exercise Name 4  Scap. squeezes  -      Cueing 4  Verbal;Tactile;Demo  -      Reps 4  15  -MH      Time 4  5 sec  -         Exercise 5    Exercise Name 5  pulley: flex and scaption (completed within protocol limitations)  -      Cueing 5  Verbal;Tactile;Demo  -      Time 5  4 min each  -        User Key  (r) = Recorded By, (t) = Taken By, (c) = Cosigned By    Initials Name Provider Type     Bruce Rosales PTA Physical Therapy Assistant                      Manual Rx (last 36 hours)      Manual Treatments     Row Name 01/22/21 0800             Manual Rx 1    Manual Rx 1 Location  Left shoulder PROM with patient supine.  -      Manual Rx 1 Type  PROM for flexion, abd/scaption, and ER  -      Manual Rx 1 Duration  limited to 140 degrees flexion and abd/scaption and ER 20 degrees per protocol.   -        User Key  (r) =  Recorded By, (t) = Taken By, (c) = Cosigned By    Initials Name Provider Type     Bruce Rosales PTA Physical Therapy Assistant              Therapy Education  Education Details: written instruction of scap squeezes issued and reviewed  Given: HEP  Program: New, Reinforced  How Provided: Verbal, Demonstration, Written  Provided to: Patient  Level of Understanding: Teach back education performed, Verbalized, Demonstrated              Time Calculation:   Start Time: 0808  Stop Time: 0858  Time Calculation (min): 50 min  Therapy Charges for Today     Code Description Service Date Service Provider Modifiers Qty    20919900298 HC PT MANUAL THERAPY EA 15 MIN 1/22/2021 Bruce Rosales PTA GP 1    78711859576 HC PT THER PROC EA 15 MIN 1/22/2021 Bruce Rosales PTA GP 1                    Bruce Rosales PTA  1/22/2021

## 2021-01-26 ENCOUNTER — HOSPITAL ENCOUNTER (OUTPATIENT)
Dept: PHYSICAL THERAPY | Facility: HOSPITAL | Age: 61
Setting detail: THERAPIES SERIES
Discharge: HOME | End: 2021-01-26

## 2021-01-26 DIAGNOSIS — Z96.612 STATUS POST REVERSE TOTAL REPLACEMENT OF LEFT SHOULDER: Primary | ICD-10-CM

## 2021-01-26 PROCEDURE — 97110 THERAPEUTIC EXERCISES: CPT

## 2021-01-26 PROCEDURE — 97140 MANUAL THERAPY 1/> REGIONS: CPT

## 2021-01-26 NOTE — THERAPY TREATMENT NOTE
Outpatient Physical Therapy Ortho Treatment Note   Colleen Calabrese     Patient Name: Ashwin Anand  : 1960  MRN: [id number]  Today's Date: 2021      Visit Date: 2021    Visit Dx:    ICD-10-CM ICD-9-CM   1. Status post reverse total replacement of left shoulder  Z96.612 V43.61       Patient Active Problem List   Diagnosis   • Schizophrenia, schizo-affective (CMS/HCC)   • COPD (chronic obstructive pulmonary disease) (CMS/HCC)   • Gastroesophageal reflux disease without esophagitis   • Pain   • Shoulder arthritis        Past Medical History:   Diagnosis Date   • Arthritis    • COPD (chronic obstructive pulmonary disease) (CMS/HCC)    • GERD (gastroesophageal reflux disease)    • Left shoulder pain    • Schizophrenia, schizo-affective (CMS/HCC)     PATIENT DENIES THIS IN HIS MEDICAL HISTORY        Past Surgical History:   Procedure Laterality Date   • ANTERIOR CERVICAL DISCECTOMY W/ FUSION     • APPENDECTOMY     • FINGER SURGERY Right     TENDON AND NERVE REPAIR   • KNEE SURGERY Right     AS A CHILD   • LUMBAR DISCECTOMY     • TOTAL SHOULDER ARTHROPLASTY W/ DISTAL CLAVICLE EXCISION Left 2020    Procedure: TOTAL SHOULDER REVERSE ARTHROPLASTY;  Surgeon: Ashutosh Mehta MD;  Location: [facility];  Service: Orthopedics;  Laterality: Left;       PT Ortho     Row Name 21 0874       Subjective Comments    Subjective Comments  pt reports his shoulder gets tight during the night; overall doing well  -       Precautions and Contraindications    Precautions/Limitations  shoulder precautions  -    Precautions  no shoulder extension or IR of left shoulder (no reaching behind back) and no AROM left shoulder at this time  -      User Key  (r) = Recorded By, (t) = Taken By, (c) = Cosigned By    Initials Name Provider Type     Bruce Rosales, PTA Physical Therapy Assistant                      PT Assessment/Plan     Row Name 21 8466          PT Assessment    Assessment Comments  muscle  tightness/tension noted throughout upper arm during PROM; pt continues to tolerate ex's within protocol limitations well  -        PT Plan    PT Plan Comments  Cont per POC, progressing per protocol; pt to trial MHP to upper arm  -       User Key  (r) = Recorded By, (t) = Taken By, (c) = Cosigned By    Initials Name Provider Type     Connie Brucejonh Weir PTA Physical Therapy Assistant            OP Exercises     Row Name 01/26/21 0800             Subjective Comments    Subjective Comments  pt reports his shoulder gets tight during the night; overall doing well  -         Exercise 1    Exercise Name 1  supine: cane flex  -MH      Cueing 1  Verbal;Tactile;Demo  -MH      Reps 1  10  -MH      Time 1  140 degree limit  -MH         Exercise 2    Exercise Name 2  supine: cane ER  -MH      Cueing 2  Verbal;Tactile;Demo  -MH      Reps 2  10  -MH      Time 2  20 degree limit  -MH         Exercise 3    Exercise Name 3  Submax isometrics: flex, ABD, Ext  -      Cueing 3  Verbal;Tactile;Demo  -      Reps 3  15  -MH      Time 3  5 sec  -MH         Exercise 4    Exercise Name 4  Scap. squeezes  -      Cueing 4  Verbal;Tactile;Demo  -MH      Reps 4  15  -MH      Time 4  5 sec  -MH         Exercise 5    Exercise Name 5  pulley: flex and scaption (completed within protocol limitations)  -      Cueing 5  Verbal;Tactile;Demo  -MH      Time 5  4 min each  -        User Key  (r) = Recorded By, (t) = Taken By, (c) = Cosigned By    Initials Name Provider Type     aSndra Rosalesjonh Weir PTA Physical Therapy Assistant                      Manual Rx (last 36 hours)      Manual Treatments     Row Name 01/26/21 0800             Manual Rx 1    Manual Rx 1 Location  Left shoulder PROM with patient supine.  -      Manual Rx 1 Type  PROM for flexion, abd/scaption, and ER  -MH      Manual Rx 1 Duration  limited to 140 degrees flexion and abd/scaption and ER 20 degrees per protocol.   -        User Key  (r) = Recorded By, (t) = Taken  By, (c) = Cosigned By    Initials Name Provider Type     Bruce Rosales PTA Physical Therapy Assistant              Therapy Education  Education Details: Encouraged trial of heat and massage to upper arm as part of HEP  Given: HEP, Symptoms/condition management  Program: Reinforced  How Provided: Verbal  Provided to: Patient  Level of Understanding: Teach back education performed, Verbalized, Demonstrated              Time Calculation:   Start Time: 0804  Stop Time: 0838  Time Calculation (min): 34 min  Therapy Charges for Today     Code Description Service Date Service Provider Modifiers Qty    12118591839 HC PT MANUAL THERAPY EA 15 MIN 1/26/2021 Bruce Rosales PTA GP 1    [id number] HC PT THER PROC EA 15 MIN 1/26/2021 Bruce Rosales PTA GP 1                    Bruce Rosales PTA  1/26/2021

## 2021-01-28 ENCOUNTER — HOSPITAL ENCOUNTER (OUTPATIENT)
Dept: PHYSICAL THERAPY | Facility: HOSPITAL | Age: 61
Setting detail: THERAPIES SERIES
Discharge: HOME | End: 2021-01-28

## 2021-01-28 DIAGNOSIS — Z96.612 STATUS POST REVERSE TOTAL REPLACEMENT OF LEFT SHOULDER: Primary | ICD-10-CM

## 2021-01-28 PROCEDURE — 97140 MANUAL THERAPY 1/> REGIONS: CPT

## 2021-01-28 PROCEDURE — 97110 THERAPEUTIC EXERCISES: CPT

## 2021-01-28 NOTE — THERAPY TREATMENT NOTE
Outpatient Physical Therapy Ortho Treatment Note   Colleen Calabrese     Patient Name: Ashwin Anand  : 1960  MRN: [id number]  Today's Date: 2021      Visit Date: 2021    Visit Dx:    ICD-10-CM ICD-9-CM   1. Status post reverse total replacement of left shoulder  Z96.612 V43.61       Patient Active Problem List   Diagnosis   • Schizophrenia, schizo-affective (CMS/HCC)   • COPD (chronic obstructive pulmonary disease) (CMS/HCC)   • Gastroesophageal reflux disease without esophagitis   • Pain   • Shoulder arthritis        Past Medical History:   Diagnosis Date   • Arthritis    • COPD (chronic obstructive pulmonary disease) (CMS/HCC)    • GERD (gastroesophageal reflux disease)    • Left shoulder pain    • Schizophrenia, schizo-affective (CMS/HCC)     PATIENT DENIES THIS IN HIS MEDICAL HISTORY        Past Surgical History:   Procedure Laterality Date   • ANTERIOR CERVICAL DISCECTOMY W/ FUSION     • APPENDECTOMY     • FINGER SURGERY Right     TENDON AND NERVE REPAIR   • KNEE SURGERY Right     AS A CHILD   • LUMBAR DISCECTOMY     • TOTAL SHOULDER ARTHROPLASTY W/ DISTAL CLAVICLE EXCISION Left 2020    Procedure: TOTAL SHOULDER REVERSE ARTHROPLASTY;  Surgeon: Ashutosh Mehta MD;  Location: [facility];  Service: Orthopedics;  Laterality: Left;       PT Ortho     Row Name 21 0842       Subjective Comments    Subjective Comments  pt continues to report minimal to no pain; pt now able to yamilet/doff tshirt without difficulty   -       Precautions and Contraindications    Precautions/Limitations  shoulder precautions  -    Precautions  no shoulder extension or IR of left shoulder (no reaching behind back) and no AROM left shoulder at this time  -      User Key  (r) = Recorded By, (t) = Taken By, (c) = Cosigned By    Initials Name Provider Type    Bruce Dillon, PASCUAL Physical Therapy Assistant                      PT Assessment/Plan     Row Name 21 5722          PT Assessment     Assessment Comments  pt continues to show progression of PROM tolerance with minimal to no resistance noted at end ranges of restrictions; pt progressing with functional mobility with ability to yamilet/doff shirts  -        PT Plan    PT Plan Comments  Cont per POC, progressing per protocol  -       User Key  (r) = Recorded By, (t) = Taken By, (c) = Cosigned By    Initials Name Provider Type     Bruce Rosales PTA Physical Therapy Assistant            OP Exercises     Row Name 01/28/21 0800             Subjective Comments    Subjective Comments  pt continues to report minimal to no pain; pt now able to yamilet/doff tshirt without difficulty   -         Exercise 1    Exercise Name 1  supine: cane flex  -      Cueing 1  Verbal;Tactile;Demo  -      Reps 1  10  -MH      Time 1  140 degree limit  -         Exercise 2    Exercise Name 2  supine: cane ER  -      Cueing 2  Verbal;Tactile;Demo  -      Reps 2  10  -MH      Time 2  20 degree limit  -         Exercise 3    Exercise Name 3  Submax isometrics: flex, ABD, Ext  -      Cueing 3  Verbal;Tactile;Demo  -      Reps 3  15  -MH      Time 3  5 sec  -         Exercise 4    Exercise Name 4  Scap. squeezes  -      Cueing 4  Verbal;Tactile;Demo  -      Reps 4  20  -MH      Time 4  5 sec  -         Exercise 5    Exercise Name 5  pulley: flex and scaption (completed within protocol limitations)  -      Cueing 5  Verbal;Tactile;Demo  -      Time 5  4 min each  -        User Key  (r) = Recorded By, (t) = Taken By, (c) = Cosigned By    Initials Name Provider Type     Bruce Rosales PTA Physical Therapy Assistant                      Manual Rx (last 36 hours)      Manual Treatments     Row Name 01/28/21 0800             Manual Rx 1    Manual Rx 1 Location  Left shoulder PROM with patient supine.  -      Manual Rx 1 Type  PROM for flexion, abd/scaption, and ER  -      Manual Rx 1 Duration  limited to 140 degrees flexion and abd/scaption and  ER 20 degrees per protocol.   -        User Key  (r) = Recorded By, (t) = Taken By, (c) = Cosigned By    Initials Name Provider Type     Bruce Rsoales PTA Physical Therapy Assistant              Therapy Education  Given: HEP  Program: Reinforced  How Provided: Verbal  Provided to: Patient  Level of Understanding: Teach back education performed, Verbalized, Demonstrated              Time Calculation:   Start Time: 0800  Stop Time: 0845  Time Calculation (min): 45 min  Therapy Charges for Today     Code Description Service Date Service Provider Modifiers Qty    45819030708 HC PT MANUAL THERAPY EA 15 MIN 1/28/2021 Bruce Rosales PTA GP 1    33892951739 HC PT THER PROC EA 15 MIN 1/28/2021 Bruce Rosales PTA GP 1                    Bruce Rosales PTA  1/28/2021

## 2021-01-29 ENCOUNTER — OFFICE VISIT (OUTPATIENT)
Dept: ORTHOPEDIC SURGERY | Facility: CLINIC | Age: 61
End: 2021-01-29

## 2021-01-29 VITALS — BODY MASS INDEX: 24.16 KG/M2 | HEIGHT: 65 IN | WEIGHT: 145 LBS | TEMPERATURE: 97.4 F

## 2021-01-29 DIAGNOSIS — Z09 SURGERY FOLLOW-UP: Primary | ICD-10-CM

## 2021-01-29 PROCEDURE — 99024 POSTOP FOLLOW-UP VISIT: CPT | Performed by: NURSE PRACTITIONER

## 2021-01-29 PROCEDURE — 73030 X-RAY EXAM OF SHOULDER: CPT | Performed by: NURSE PRACTITIONER

## 2021-01-29 NOTE — PROGRESS NOTES
"Ashwin Anand : 1960 MRN: [id number] DATE: 2021    DIAGNOSIS: 6 week follow up left shoulder arthroplasty      SUBJECTIVE:  Patient returns today for 6 week follow up of left shoulder replacement. Patient reports doing well.  Reports intermittent numbness and tingling in his left small and ring fingers.      OBJECTIVE:    Temp 97.4 °F (36.3 °C)   Ht 165.1 cm (65\")   Wt 65.8 kg (145 lb)   BMI 24.13 kg/m²     Exam: The incision is well healed. No erythema or drainage. Shoulder moves fluidly with pendulums.  Motion is on track per protocol.  The arm is soft and nontender.  Good motor and sensory function.  Palpable distal pulses.     DIAGNOSTIC STUDIES    Xrays: AP and scapular Y views of the left shoulder are ordered and reviewed for evaluation of shoulder replacement.  They demonstrate a well positioned, well aligned replacement without complicating factors noted.  In comparison with previous films there has been no change.    ASSESSMENT: 6 week follow up left shoulder replacement    PLAN:   1.  Continue PT per protocol.  2.  Discontinue sling and begin working on progressing ROM as tolerated.  3.  The numbness and tingling are likely from ulnar nerve irritation due to sling use.  I offered to provide him with a night splint, but he declined for now.  If risks symptoms persist or worsen, he will discuss with Dr. Mehta had his follow-up appointment.  4.  Counseled patient about appropriate activity modifications and restrictions.  Released to drive at this point.  5.   Follow-up in 6 weeks.      Kirsten Perez, APRN    2021     "

## 2021-02-02 ENCOUNTER — HOSPITAL ENCOUNTER (OUTPATIENT)
Dept: PHYSICAL THERAPY | Facility: HOSPITAL | Age: 61
Setting detail: THERAPIES SERIES
Discharge: HOME | End: 2021-02-02

## 2021-02-02 DIAGNOSIS — Z96.612 STATUS POST REVERSE TOTAL REPLACEMENT OF LEFT SHOULDER: Primary | ICD-10-CM

## 2021-02-02 PROCEDURE — 97140 MANUAL THERAPY 1/> REGIONS: CPT

## 2021-02-02 PROCEDURE — 97110 THERAPEUTIC EXERCISES: CPT

## 2021-02-02 NOTE — THERAPY TREATMENT NOTE
Outpatient Physical Therapy Ortho Treatment Note   Colleen Calabrese     Patient Name: Ashwin Anand  : 1960  MRN: [id number]  Today's Date: 2021      Visit Date: 2021    Visit Dx:    ICD-10-CM ICD-9-CM   1. Status post reverse total replacement of left shoulder  Z96.612 V43.61       Patient Active Problem List   Diagnosis   • Schizophrenia, schizo-affective (CMS/HCC)   • COPD (chronic obstructive pulmonary disease) (CMS/HCC)   • Gastroesophageal reflux disease without esophagitis   • Pain   • Shoulder arthritis        Past Medical History:   Diagnosis Date   • Arthritis    • COPD (chronic obstructive pulmonary disease) (CMS/HCC)    • GERD (gastroesophageal reflux disease)    • Left shoulder pain    • Schizophrenia, schizo-affective (CMS/HCC)     PATIENT DENIES THIS IN HIS MEDICAL HISTORY        Past Surgical History:   Procedure Laterality Date   • ANTERIOR CERVICAL DISCECTOMY W/ FUSION     • APPENDECTOMY     • FINGER SURGERY Right     TENDON AND NERVE REPAIR   • KNEE SURGERY Right     AS A CHILD   • LUMBAR DISCECTOMY     • TOTAL SHOULDER ARTHROPLASTY W/ DISTAL CLAVICLE EXCISION Left 2020    Procedure: TOTAL SHOULDER REVERSE ARTHROPLASTY;  Surgeon: Ashutosh Mehta MD;  Location: [facility];  Service: Orthopedics;  Laterality: Left;       PT Ortho     Row Name 21 0900       Subjective Comments    Subjective Comments  Pt states his shoulder continues to do well; RTW yesterday just to answer phones while his boss is out of town  -       Precautions and Contraindications    Precautions/Limitations  shoulder precautions  -    Precautions  no shoulder extension or IR of left shoulder (no reaching behind back) and no AROM left shoulder at this time  -      User Key  (r) = Recorded By, (t) = Taken By, (c) = Cosigned By    Initials Name Provider Type     Bruce Rosales, PTA Physical Therapy Assistant                      PT Assessment/Plan     Row Name 21 1999          PT  Assessment    Assessment Comments  Pt continues to do well with minimal to no pain (L) shoulder; continues with good tolerance to PROM and progression to standing AAROM with cane  -        PT Plan    PT Plan Comments  Cont to progress per POC;  add isometric ER and IR next visit per protocol  -       User Key  (r) = Recorded By, (t) = Taken By, (c) = Cosigned By    Initials Name Provider Type     Bruce Rosales PTA Physical Therapy Assistant            OP Exercises     Row Name 02/02/21 0900             Subjective Comments    Subjective Comments  Pt states his shoulder continues to do well; RTW yesterday just to answer phones while his boss is out of town  -         Exercise 1    Exercise Name 1  standing: cane flex  -      Cueing 1  Verbal;Tactile;Demo  -      Reps 1  15  -MH      Time 1  140 degree limit  -         Exercise 2    Exercise Name 2  standing: cane ER  -      Cueing 2  Verbal;Tactile;Demo  -      Reps 2  15  -MH      Time 2  20 degree limit  -         Exercise 3    Exercise Name 3  Submax isometrics: flex, ABD, Ext  -      Cueing 3  Verbal;Tactile;Demo  -      Reps 3  15  -      Time 3  5 sec  -         Exercise 4    Exercise Name 4  Scap. squeezes  -      Cueing 4  Verbal;Tactile;Demo  -      Reps 4  20  -      Time 4  5 sec  -         Exercise 5    Exercise Name 5  pulley: flex and scaption (completed within protocol limitations)  -      Cueing 5  Verbal;Tactile;Demo  -      Time 5  4 min each  -        User Key  (r) = Recorded By, (t) = Taken By, (c) = Cosigned By    Initials Name Provider Type     Bruce Rosales PTA Physical Therapy Assistant                      Manual Rx (last 36 hours)      Manual Treatments     Row Name 02/02/21 0900             Manual Rx 1    Manual Rx 1 Location  Left shoulder PROM with patient supine.  -      Manual Rx 1 Type  PROM for flexion, abd/scaption, and ER  -      Manual Rx 1 Duration  limited to 140 degrees  flexion and abd/scaption and ER 20 degrees per protocol.   -        User Key  (r) = Recorded By, (t) = Taken By, (c) = Cosigned By    Initials Name Provider Type     Bruce Rosales PTA Physical Therapy Assistant              Therapy Education  Given: HEP  Program: Reinforced  How Provided: Verbal  Provided to: Patient  Level of Understanding: Teach back education performed, Verbalized, Demonstrated              Time Calculation:   Start Time: 0802  Stop Time: 0849  Time Calculation (min): 47 min  Therapy Charges for Today     Code Description Service Date Service Provider Modifiers Qty    23527945888 HC PT MANUAL THERAPY EA 15 MIN 2/2/2021 Bruce Rosales PTA GP 1    30257558460 HC PT THER PROC EA 15 MIN 2/2/2021 Bruce Rosales PTA GP 1                    Bruce Rosales PTA  2/2/2021

## 2021-02-04 ENCOUNTER — HOSPITAL ENCOUNTER (OUTPATIENT)
Dept: PHYSICAL THERAPY | Facility: HOSPITAL | Age: 61
Setting detail: THERAPIES SERIES
Discharge: HOME | End: 2021-02-04

## 2021-02-04 PROCEDURE — 97140 MANUAL THERAPY 1/> REGIONS: CPT

## 2021-02-04 PROCEDURE — 97110 THERAPEUTIC EXERCISES: CPT

## 2021-02-04 NOTE — THERAPY TREATMENT NOTE
Outpatient Physical Therapy Ortho Treatment Note   Colleen Calabrese     Patient Name: Ashwin Anand  : 1960  MRN: [id number]  Today's Date: 2021      Visit Date: 2021    Visit Dx:  No diagnosis found.    Patient Active Problem List   Diagnosis   • Schizophrenia, schizo-affective (CMS/HCC)   • COPD (chronic obstructive pulmonary disease) (CMS/HCC)   • Gastroesophageal reflux disease without esophagitis   • Pain   • Shoulder arthritis        Past Medical History:   Diagnosis Date   • Arthritis    • COPD (chronic obstructive pulmonary disease) (CMS/HCC)    • GERD (gastroesophageal reflux disease)    • Left shoulder pain    • Schizophrenia, schizo-affective (CMS/HCC)     PATIENT DENIES THIS IN HIS MEDICAL HISTORY        Past Surgical History:   Procedure Laterality Date   • ANTERIOR CERVICAL DISCECTOMY W/ FUSION     • APPENDECTOMY     • FINGER SURGERY Right     TENDON AND NERVE REPAIR   • KNEE SURGERY Right     AS A CHILD   • LUMBAR DISCECTOMY     • TOTAL SHOULDER ARTHROPLASTY W/ DISTAL CLAVICLE EXCISION Left 2020    Procedure: TOTAL SHOULDER REVERSE ARTHROPLASTY;  Surgeon: Ashutosh Mehta MD;  Location: [facility];  Service: Orthopedics;  Laterality: Left;       PT Ortho     Row Name 21 0800       Subjective Comments    Subjective Comments  pt continues to report minimal symptoms in (L) shoulder - doing well with HEP  -MH       Precautions and Contraindications    Precautions/Limitations  shoulder precautions  -MH    Precautions  no shoulder extension or IR of left shoulder (no reaching behind back) and no AROM left shoulder at this time  -      User Key  (r) = Recorded By, (t) = Taken By, (c) = Cosigned By    Initials Name Provider Type     Bruce Rosales, PTA Physical Therapy Assistant                      PT Assessment/Plan     Row Name 21 0820          PT Assessment    Assessment Comments  pt continues to have minimal to no pain (L) shoulder and maintains good PROM; pt  tolerated additional isometrics and AROM this session  -        PT Plan    PT Plan Comments  Cont per POC, progress per protocol  -       User Key  (r) = Recorded By, (t) = Taken By, (c) = Cosigned By    Initials Name Provider Type    Bruce Dillon PTA Physical Therapy Assistant            OP Exercises     Row Name 02/04/21 0800             Subjective Comments    Subjective Comments  pt continues to report minimal symptoms in (L) shoulder - doing well with HEP  -         Exercise 1    Exercise Name 1  standing: cane flex  -      Cueing 1  Verbal;Tactile;Demo  -      Reps 1  15  -MH      Time 1  140 degree limit  -         Exercise 2    Exercise Name 2  standing: cane ER  -      Cueing 2  Verbal;Tactile;Demo  -      Reps 2  15  -MH      Time 2  20 degree limit  -         Exercise 3    Exercise Name 3  Submax isometrics: flex, ABD, Ext, IR and ER  -      Cueing 3  Verbal;Tactile;Demo  -      Reps 3  15  -      Time 3  5 sec  -      Additional Comments  IR and ER added this session  -         Exercise 4    Exercise Name 4  Scap. squeezes  -      Cueing 4  Verbal;Tactile;Demo  -      Reps 4  20  -      Time 4  5 sec  -         Exercise 5    Exercise Name 5  pulley: flex and scaption (completed within protocol limitations)  -      Cueing 5  Verbal;Tactile;Demo  -      Time 5  4 min each  -         Exercise 6    Exercise Name 6  supine: AROM flexion  -      Cueing 6  Verbal;Tactile;Demo  -      Reps 6  15  -MH        User Key  (r) = Recorded By, (t) = Taken By, (c) = Cosigned By    Initials Name Provider Type    Bruce Dillon PTA Physical Therapy Assistant                      Manual Rx (last 36 hours)      Manual Treatments     Row Name 02/04/21 0800             Manual Rx 1    Manual Rx 1 Location  Left shoulder PROM with patient supine.  -      Manual Rx 1 Type  PROM for flexion, abd/scaption, and ER  -      Manual Rx 1 Duration  limited to 140 degrees  flexion and abd/scaption and ER 20 degrees per protocol.   -        User Key  (r) = Recorded By, (t) = Taken By, (c) = Cosigned By    Initials Name Provider Type     Bruce Rosales PTA Physical Therapy Assistant              Therapy Education  Education Details: written instruction of new ex's issued and reviewed  Given: HEP  Program: New, Reinforced, Progressed  How Provided: Verbal, Demonstration, Written  Provided to: Patient  Level of Understanding: Teach back education performed, Verbalized, Demonstrated              Time Calculation:   Start Time: 0803  Stop Time: 0850  Time Calculation (min): 47 min  Therapy Charges for Today     Code Description Service Date Service Provider Modifiers Qty    32845671117 HC PT MANUAL THERAPY EA 15 MIN 2/4/2021 Bruce Rosales PTA GP 1    07072175949 HC PT THER PROC EA 15 MIN 2/4/2021 Bruce Rosales PTA GP 1                    Bruce Rosales PTA  2/4/2021

## 2021-02-09 ENCOUNTER — HOSPITAL ENCOUNTER (OUTPATIENT)
Dept: PHYSICAL THERAPY | Facility: HOSPITAL | Age: 61
Setting detail: THERAPIES SERIES
Discharge: HOME | End: 2021-02-09

## 2021-02-09 DIAGNOSIS — Z96.612 STATUS POST REVERSE TOTAL REPLACEMENT OF LEFT SHOULDER: Primary | ICD-10-CM

## 2021-02-09 PROCEDURE — 97140 MANUAL THERAPY 1/> REGIONS: CPT

## 2021-02-09 PROCEDURE — 97110 THERAPEUTIC EXERCISES: CPT

## 2021-02-09 NOTE — THERAPY RE-EVALUATION
Outpatient Physical Therapy Ortho Re-Evaluation   [place]     Patient Name: Ashwin Anand  : 1960  MRN: [id number]  Today's Date: 2021      Visit Date: 2021    Patient Active Problem List   Diagnosis   • Schizophrenia, schizo-affective (CMS/HCC)   • COPD (chronic obstructive pulmonary disease) (CMS/HCC)   • Gastroesophageal reflux disease without esophagitis   • Pain   • Shoulder arthritis        Past Medical History:   Diagnosis Date   • Arthritis    • COPD (chronic obstructive pulmonary disease) (CMS/HCC)    • GERD (gastroesophageal reflux disease)    • Left shoulder pain    • Schizophrenia, schizo-affective (CMS/HCC)     PATIENT DENIES THIS IN HIS MEDICAL HISTORY        Past Surgical History:   Procedure Laterality Date   • ANTERIOR CERVICAL DISCECTOMY W/ FUSION     • APPENDECTOMY     • FINGER SURGERY Right     TENDON AND NERVE REPAIR   • KNEE SURGERY Right     AS A CHILD   • LUMBAR DISCECTOMY     • TOTAL SHOULDER ARTHROPLASTY W/ DISTAL CLAVICLE EXCISION Left 2020    Procedure: TOTAL SHOULDER REVERSE ARTHROPLASTY;  Surgeon: Ashutosh Mehta MD;  Location: [facility];  Service: Orthopedics;  Laterality: Left;       Visit Dx:     ICD-10-CM ICD-9-CM   1. Status post reverse total replacement of left shoulder  Z96.612 V43.61             PT Ortho     Row Name 21 0900       Left Upper Ext    Lt Shoulder Abduction AROM  NT  -LN    Lt Shoulder Abduction PROM  150 degrees scaption  -LN    Lt Shoulder Extension AROM  NT  -LN    Lt Shoulder Extension PROM  NT per protocol  -LN    Lt Shoulder Flexion AROM  140 degrees supine per protocol limitation  -LN    Lt Shoulder Flexion PROM  140 degrees  -LN    Lt Shoulder External Rotation AROM  NT  -LN    Lt Shoulder External Rotation PROM  45 degrees about 20 degrees abduction  -LN    Lt Shoulder Internal Rotation AROM  NT  -LN    Lt Shoulder Internal Rotation PROM  45 degrees about 20 degrees abduction  -LN    Lt Elbow Extension/Flexion  "AROM  WFL  -LN    Lt Elbow Extension/Flexion PROM  WNL  -LN    Lt Elbow Supination AROM  WNL  -LN    Lt Elbow Supination PROM  WNL  -LN    Lt Elbow Pronation AROM  WNL  -LN    Lt Elbow Pronation PROM  WNL  -LN       MMT Left Upper Ext    Lt Shoulder Flexion MMT, Gross Movement  (3/5) fair  -LN    Lt Shoulder ABduction MMT, Gross Movement  (3/5) fair  -LN    Lt Shoulder Internal Rotation MMT, Gross Movement  (2+/5) poor plus  -LN    Lt Shoulder External Rotation MMT, Gross Movement  (2+/5) poor plus  -LN    Lt Upper Extremity Comments   all done grossly  -LN       Sensation    Sensation WNL?  WNL  -LN    Light Touch  No apparent deficits  -LN    Additional Comments  no c/o any N/T left UE  -LN       Upper Extremity Flexibility    Upper Trapezius  Left:;WNL  -LN    Pect Minor  Left:;Mildly limited  -LN    Pect Major  Left:;Mildly limited  -LN    Row Name 02/09/21 0800       Precautions and Contraindications    Precautions/Limitations  shoulder precautions  -LN    Precautions  no shoulder extension  -LN       Subjective Pain    Subjective Pain Comment  \"just soreness\"  -LN      User Key  (r) = Recorded By, (t) = Taken By, (c) = Cosigned By    Initials Name Provider Type    Sunni Randolph, PT Physical Therapist                      Therapy Education  Education Details: Patient to continue with HEP and use MH/CP PRN. Patient instructed to still not do any shoulder extension/ putting arm behind back as to get his wallet out of back pocket.  Given: Symptoms/condition management  Program: Reinforced, Modified(changed IR and ER isometrics to pushing against other hand vs. wall.)  How Provided: Verbal, Demonstration  Provided to: Patient  Level of Understanding: Teach back education performed, Verbalized, Demonstrated     PT OP Goals     Row Name 02/09/21 0900          PT Short Term Goals    STG Date to Achieve  -- all STG met  -LN     STG 1  Patient to verbally report decreased left shoulder pain to <3/10 with " "exercises and everyday home activities.   -LN     STG 1 Progress  Met  -LN     STG 2  Patient independent with initial HEP (per protocol)  issued by therapist.   -LN     STG 2 Progress  Met  -LN     STG 3  Patient to have improved left shoulder PROM to 120 degrees flexion and abduction/scaption.   -LN     STG 3 Progress  Met  -LN     STG 4  Patient able to perform 10 reps AAROM exercises without any c/o increased left shoulder pain.   -LN     STG 4 Progress  Met  -        Long Term Goals    LTG Date to Achieve  03/09/21  -LN     LTG 1  Patient independent with more advanced HEP (per protocol) issued by therapist, including submax isometrics.  -LN     LTG 1 Progress  Partially Met;Ongoing;Progressing  -LN     LTG 1 Progress Comments  met for sub max isometrics (only more advanced exercises begun so far).  -LN     LTG 2  Patient to have improved left shoulder P/AAROM to 140 degrees flexion and abduction/scaption, & 40 degrees IR & ER.   -LN     LTG 2 Progress  Met  -LN     LTG 3  Patient to verbally report decreased left shoulder pain to 0-1/10 with ADLs and everyday home activities.   -LN     LTG 3 Progress  Met  -LN     LTG 3 Progress Comments  No c/o any shoulder pain; only \"soreness:   -LN     LTG 4  Patient able to begin to use left arm for routine ADLs, including feeding, dressing, and bathing (but no shoulder extension) with left shoulder pain no > than 2/10.   -LN     LTG 4 Progress  Partially Met;Ongoing;Progressing  -LN     LTG 4 Progress Comments  beginning to have increased use of left UE with ADLs; able to use left arm for feeding, brushing teeth, and washing his hair; he has not tried to put on a t-shirt yet (only doing button up shirts); still has trouble putting deodorant on right arm.  -LN     LTG 5  Patient to have improved PROM left shoulder to WFL all planes.  -LN     LTG 5 Progress  New  -LN     LTG 6  Patient to have improved left shoulder AROM to 140 degrees flexion and scaption (against " "gravity) to allow for improved functional use of left UE with all ADLs.   -LN     LTG 6 Progress  New  -LN     LTG 7  Left shoulder strength improved to 4/5 all planes.  -LN     LTG 7 Progress  New  -LN     LTG 8  Patient able to don and doff a t-shirt without any c/o difficulty or left shoulder pain.  -LN     LTG 8 Progress  New  -LN     LTG 9  Patient able to put deodorant onto right axilla , using left arm without any difficulty or c/o left shoulder pain.  -LN     LTG 9 Progress  New  -LN        Time Calculation    PT Goal Re-Cert Due Date  03/09/21  -LN       User Key  (r) = Recorded By, (t) = Taken By, (c) = Cosigned By    Initials Name Provider Type    Sunni Randolph, PT Physical Therapist          PT Assessment/Plan     Row Name 02/09/21 0900          PT Assessment    Assessment Comments  Patient is progressing very well with PT and per protocol. Improved P/AAROM noted in all planes. Strength is improving slowly. Patient with improved functional use of left UE with ADLs (eating, brushing teeth, washing hair); but he has not tried to put on a t-shirt yet and still has difficulty putting deodorant on right arm. He is doing very well with HEP.  No c/o any left shoulder pain, only \"soreness\". He has met all his STG and has met 2 LTG and partially met 2 LTG; 5 new LTG set today. He is making good progress towards all remaining goals.   -LN        PT Plan    PT Frequency  2x/week  -LN     PT Plan Comments  Continue per POC and per protocol. Begin light TB exercises at post-op week 9 (2/18/2021). Modalities PRN. Patient education. Continue towards all remaining LTG.  -LN       User Key  (r) = Recorded By, (t) = Taken By, (c) = Cosigned By    Initials Name Provider Type    Sunni Randolph, PT Physical Therapist          Modalities     Row Name 02/09/21 0900             Moist Heat    Patient denies application of MH  Yes  -LN         Ice    Patient denies application of Ice  Yes  -LN      " "Patient reports will apply ice at home to involved area  Yes  -LN        User Key  (r) = Recorded By, (t) = Taken By, (c) = Cosigned By    Initials Name Provider Type    Sunni Randolph, MATTHEW Physical Therapist        OP Exercises     Row Name 02/09/21 0900 02/09/21 0800          Subjective Comments    Subjective Comments  --  \"My shoulder is doing really good.\"  Patient reports only soreness in shoulder, no c/o any pain. He reports he is doing well with his HEP.   -LN        Subjective Pain    Subjective Pain Comment  --  \"just soreness\"  -LN        Exercise 1    Exercise Name 1  standing: cane flex  -LN  --     Cueing 1  Verbal;Tactile;Demo  -LN  --     Reps 1  15  -LN  --     Time 1  140 degree limit  -LN  --        Exercise 2    Exercise Name 2  standing: cane ER  -LN  --     Cueing 2  Verbal;Tactile;Demo  -LN  --     Reps 2  15  -LN  --     Time 2  45 degree limit  -LN  --        Exercise 3    Exercise Name 3  Submax isometrics: flex, ABD, Ext, IR and ER  -LN  --     Cueing 3  Verbal;Tactile;Demo  -LN  --     Reps 3  15 each  -LN  --     Time 3  5 sec  -LN  --        Exercise 4    Exercise Name 4  Scap. squeezes  -LN  --     Cueing 4  Verbal;Tactile;Demo  -LN  --     Reps 4  20  -LN  --     Time 4  5 sec  -LN  --     Additional Comments  HEP  -LN  --        Exercise 5    Exercise Name 5  pulley: flex and scaption (completed within protocol limitations)  -LN  --     Cueing 5  Verbal;Tactile;Demo  -LN  --     Time 5  5 min each  -LN  --        Exercise 6    Exercise Name 6  supine: AROM flexion  -LN  --     Cueing 6  Verbal;Tactile;Demo  -LN  --     Reps 6  15  -LN  --       User Key  (r) = Recorded By, (t) = Taken By, (c) = Cosigned By    Initials Name Provider Type    Sunni Randolph, MATTHEW Physical Therapist           Manual Rx (last 36 hours)      Manual Treatments     Row Name 02/09/21 0900             Manual Rx 1    Manual Rx 1 Location  Left shoulder PROM with patient supine.  -LN      " Manual Rx 1 Type  PROM for flexion, abd/scaption, IR, & ER  -LN      Manual Rx 1 Duration  limited to 140 degrees flexion and abd/scaption and ER & IR 45 degrees (in scapular plane) per protocol.   -LN        User Key  (r) = Recorded By, (t) = Taken By, (c) = Cosigned By    Initials Name Provider Type    Sunni Randolph, PT Physical Therapist                                Time Calculation:     Start Time: 0857  Stop Time: 0945  Time Calculation (min): 48 min     Therapy Charges for Today     Code Description Service Date Service Provider Modifiers Qty    29474918802  PT MANUAL THERAPY EA 15 MIN 2/9/2021 Sunni Oliveira, PT GP 1    99026077091 HC PT THER PROC EA 15 MIN 2/9/2021 Sunni Oliveira, PT GP 2                    Sunni Oliveira, PT  2/9/2021

## 2021-02-12 ENCOUNTER — HOSPITAL ENCOUNTER (OUTPATIENT)
Dept: PHYSICAL THERAPY | Facility: HOSPITAL | Age: 61
Setting detail: THERAPIES SERIES
Discharge: HOME | End: 2021-02-12

## 2021-02-12 DIAGNOSIS — Z96.612 STATUS POST REVERSE TOTAL REPLACEMENT OF LEFT SHOULDER: Primary | ICD-10-CM

## 2021-02-12 PROCEDURE — 97110 THERAPEUTIC EXERCISES: CPT

## 2021-02-12 PROCEDURE — 97140 MANUAL THERAPY 1/> REGIONS: CPT

## 2021-02-12 NOTE — THERAPY TREATMENT NOTE
Outpatient Physical Therapy Ortho Treatment Note   [place]     Patient Name: Ashwin Anand  : 1960  MRN: [id number]  Today's Date: 2021      Visit Date: 2021    Visit Dx:    ICD-10-CM ICD-9-CM   1. Status post reverse total replacement of left shoulder  Z96.612 V43.61       Patient Active Problem List   Diagnosis   • Schizophrenia, schizo-affective (CMS/HCC)   • COPD (chronic obstructive pulmonary disease) (CMS/[facility])   • Gastroesophageal reflux disease without esophagitis   • Pain   • Shoulder arthritis        Past Medical History:   Diagnosis Date   • Arthritis    • COPD (chronic obstructive pulmonary disease) (CMS/[facility])    • GERD (gastroesophageal reflux disease)    • Left shoulder pain    • Schizophrenia, schizo-affective (CMS/HCC)     PATIENT DENIES THIS IN HIS MEDICAL HISTORY        Past Surgical History:   Procedure Laterality Date   • ANTERIOR CERVICAL DISCECTOMY W/ FUSION     • APPENDECTOMY     • FINGER SURGERY Right     TENDON AND NERVE REPAIR   • KNEE SURGERY Right     AS A CHILD   • LUMBAR DISCECTOMY     • TOTAL SHOULDER ARTHROPLASTY W/ DISTAL CLAVICLE EXCISION Left 2020    Procedure: TOTAL SHOULDER REVERSE ARTHROPLASTY;  Surgeon: Ashutosh Mehta MD;  Location: [facility];  Service: Orthopedics;  Laterality: Left;       PT Ortho     Row Name 21 0800       Subjective Comments    Subjective Comments  pt reports he continues to do well - no complaints today  -MH       Precautions and Contraindications    Precautions/Limitations  shoulder precautions  -    Precautions  no shoulder extension  -MH      User Key  (r) = Recorded By, (t) = Taken By, (c) = Cosigned By    Initials Name Provider Type    Bruce Dillon, PTA Physical Therapy Assistant                      PT Assessment/Plan     Row Name 21 0911          PT Assessment    Assessment Comments  pt continues to do well with ex's and ROM  -MH        PT Plan    PT Plan Comments  Cont per POC, progressing  per protocol; and light TB ex's next week  -       User Key  (r) = Recorded By, (t) = Taken By, (c) = Cosigned By    Initials Name Provider Type    Bruce Dillon PTA Physical Therapy Assistant            OP Exercises     Row Name 02/12/21 0800             Subjective Comments    Subjective Comments  pt reports he continues to do well - no complaints today  -MH         Exercise 1    Exercise Name 1  standing: cane flex  -MH      Cueing 1  Verbal  -MH      Reps 1  15  -MH      Time 1  140 degree limit  -MH         Exercise 2    Exercise Name 2  standing: cane ER  -MH      Cueing 2  Verbal  -MH      Reps 2  15  -MH      Time 2  45 degree limit  -MH         Exercise 3    Exercise Name 3  Submax isometrics: flex, ABD, Ext, IR and ER  -MH      Cueing 3  Verbal;Demo  -MH      Reps 3  15 each  -MH      Time 3  5 sec  -MH         Exercise 4    Exercise Name 4  Scap. squeezes  -MH      Cueing 4  Verbal  -MH      Reps 4  25  -MH      Time 4  5 sec  -MH         Exercise 5    Exercise Name 5  pulley: flex and scaption (completed within protocol limitations)  -MH      Cueing 5  Verbal;Tactile;Demo  -MH      Time 5  5 min each  -MH         Exercise 6    Exercise Name 6  supine: AROM flexion  -MH      Cueing 6  Verbal;Demo  -MH      Reps 6  20  -MH         Exercise 7    Exercise Name 7  sitting: AROM ER  -MH      Cueing 7  Verbal;Demo  -MH      Reps 7  20  -MH      Time 7  limited to 45 degrees  -        User Key  (r) = Recorded By, (t) = Taken By, (c) = Cosigned By    Initials Name Provider Type    Bruce Dillon PTA Physical Therapy Assistant                      Manual Rx (last 36 hours)      Manual Treatments     Row Name 02/12/21 0800             Manual Rx 1    Manual Rx 1 Location  Left shoulder PROM with patient supine.  -      Manual Rx 1 Type  PROM for flexion, abd/scaption, IR, & ER  -MH      Manual Rx 1 Duration  limited to 140 degrees flexion and abd/scaption and ER & IR 45 degrees (in scapular plane)  per protocol.   -        User Key  (r) = Recorded By, (t) = Taken By, (c) = Cosigned By    Initials Name Provider Type     Bruce Rosales PTA Physical Therapy Assistant              Therapy Education  Given: HEP  Program: Reinforced, New  How Provided: Verbal, Demonstration  Provided to: Patient  Level of Understanding: Teach back education performed, Verbalized, Demonstrated              Time Calculation:   Start Time: 0800  Stop Time: 0849  Time Calculation (min): 49 min  Therapy Charges for Today     Code Description Service Date Service Provider Modifiers Qty    04393050315 HC PT THER PROC EA 15 MIN 2/12/2021 Bruce Rosales PTA GP 2    88524830897 HC PT MANUAL THERAPY EA 15 MIN 2/12/2021 Bruce Rosales PTA GP 1                    Bruce Rosales PTA  2/12/2021

## 2021-02-16 ENCOUNTER — HOSPITAL ENCOUNTER (OUTPATIENT)
Dept: PHYSICAL THERAPY | Facility: HOSPITAL | Age: 61
Setting detail: THERAPIES SERIES
Discharge: HOME | End: 2021-02-16

## 2021-02-16 DIAGNOSIS — Z96.612 STATUS POST REVERSE TOTAL REPLACEMENT OF LEFT SHOULDER: Primary | ICD-10-CM

## 2021-02-16 PROCEDURE — 97140 MANUAL THERAPY 1/> REGIONS: CPT

## 2021-02-16 PROCEDURE — 97110 THERAPEUTIC EXERCISES: CPT

## 2021-02-16 NOTE — THERAPY TREATMENT NOTE
Outpatient Physical Therapy Ortho Treatment Note   [place]     Patient Name: Ashwin Anand  : 1960  MRN: [id number]  Today's Date: 2021      Visit Date: 2021    Visit Dx:    ICD-10-CM ICD-9-CM   1. Status post reverse total replacement of left shoulder  Z96.612 V43.61       Patient Active Problem List   Diagnosis   • Schizophrenia, schizo-affective (CMS/HCC)   • COPD (chronic obstructive pulmonary disease) (CMS/HCC)   • Gastroesophageal reflux disease without esophagitis   • Pain   • Shoulder arthritis        Past Medical History:   Diagnosis Date   • Arthritis    • COPD (chronic obstructive pulmonary disease) (CMS/HCC)    • GERD (gastroesophageal reflux disease)    • Left shoulder pain    • Schizophrenia, schizo-affective (CMS/HCC)     PATIENT DENIES THIS IN HIS MEDICAL HISTORY        Past Surgical History:   Procedure Laterality Date   • ANTERIOR CERVICAL DISCECTOMY W/ FUSION     • APPENDECTOMY     • FINGER SURGERY Right     TENDON AND NERVE REPAIR   • KNEE SURGERY Right     AS A CHILD   • LUMBAR DISCECTOMY     • TOTAL SHOULDER ARTHROPLASTY W/ DISTAL CLAVICLE EXCISION Left 2020    Procedure: TOTAL SHOULDER REVERSE ARTHROPLASTY;  Surgeon: Ashutosh Mehta MD;  Location: [facility];  Service: Orthopedics;  Laterality: Left;       PT Ortho     Row Name 21 0800       Subjective Comments    Subjective Comments  pt reports he is doing well with his HEP - consistently performs as well  -       Precautions and Contraindications    Precautions/Limitations  shoulder precautions  -    Precautions  no shoulder extension  -      User Key  (r) = Recorded By, (t) = Taken By, (c) = Cosigned By    Initials Name Provider Type    Bruce Dillon, PASCUAL Physical Therapy Assistant                      PT Assessment/Plan     Row Name 21 9105          PT Assessment    Assessment Comments  pt continues to maintain good PROM and demonstrates good flexion AROM; pt tolerated  progression of light strengthening well  -MH        PT Plan    PT Plan Comments  Cont per POC - check pt response to addition of light strengthening  -MH       User Key  (r) = Recorded By, (t) = Taken By, (c) = Cosigned By    Initials Name Provider Type    Bruce Dillon, PTA Physical Therapy Assistant            OP Exercises     Row Name 02/16/21 0800             Subjective Comments    Subjective Comments  pt reports he is doing well with his HEP - consistently performs as well  -MH         Exercise 1    Exercise Name 1  standing: cane flex  -MH      Cueing 1  Verbal  -MH      Reps 1  15  -MH      Time 1  140 degree limit  -MH         Exercise 2    Exercise Name 2  standing: cane ER  -MH      Cueing 2  Verbal  -MH      Reps 2  15  -MH      Time 2  45 degree limit  -MH         Exercise 3    Exercise Name 3  supine: AROM flexion  -MH      Cueing 3  Verbal  -MH      Reps 3  20  -MH      Time 3  1#  -MH         Exercise 4    Exercise Name 4  sitting: AROM ER  -MH      Cueing 4  Verbal;Tactile  -MH      Reps 4  20 - 1#  -MH      Time 4  limited to 45 degrees  -MH         Exercise 5    Exercise Name 5  pulley: flex and scaption (completed within protocol limitations)  -MH      Cueing 5  Verbal  -MH      Time 5  5 min each  -MH         Exercise 6    Exercise Name 6  standing: AROM flexion  -MH      Cueing 6  Verbal;Demo  -MH      Reps 6  10  -MH         Exercise 8    Exercise Name 8  TB: Rows  -MH      Cueing 8  Verbal;Demo;Tactile  -MH      Reps 8  20  -MH      Time 8  red  -MH      Additional Comments  cues to avoid extension  -MH         Exercise 9    Exercise Name 9  TB: ER  -MH      Cueing 9  Verbal;Tactile;Demo  -MH      Reps 9  20  -MH      Time 9  yellow  -MH         Exercise 10    Exercise Name 10  TB: IR  -MH      Cueing 10  Verbal;Tactile;Demo  -MH      Reps 10  20  -MH      Time 10  yellow  -MH        User Key  (r) = Recorded By, (t) = Taken By, (c) = Cosigned By    Initials Name Provider Type    ARCADIO Rosales  Bruce Weir PTA Physical Therapy Assistant                      Manual Rx (last 36 hours)      Manual Treatments     Row Name 02/16/21 0800             Manual Rx 1    Manual Rx 1 Location  Left shoulder PROM with patient supine.  -      Manual Rx 1 Type  PROM for flexion, abd/scaption, IR, & ER  -      Manual Rx 1 Duration  limited to 140 degrees flexion and abd/scaption and ER & IR 45 degrees (in scapular plane) per protocol.   -        User Key  (r) = Recorded By, (t) = Taken By, (c) = Cosigned By    Initials Name Provider Type     Bruce Rosales PTA Physical Therapy Assistant              Therapy Education  Education Details: written instruction of theraband ex's issued and reviewed - red and yellow theraband issued for home  Given: HEP  Program: New, Reinforced, Progressed  How Provided: Verbal, Demonstration, Written  Provided to: Patient  Level of Understanding: Teach back education performed, Verbalized, Demonstrated              Time Calculation:   Start Time: 0808  Stop Time: 0855  Time Calculation (min): 47 min  Therapy Charges for Today     Code Description Service Date Service Provider Modifiers Qty    77539343670 HC PT MANUAL THERAPY EA 15 MIN 2/16/2021 Bruce Rosales PTA GP 1    04367120413 HC PT THER PROC EA 15 MIN 2/16/2021 Bruce Rosales PTA GP 2                    Bruce Rosales PTA  2/16/2021

## 2021-02-18 ENCOUNTER — HOSPITAL ENCOUNTER (OUTPATIENT)
Dept: PHYSICAL THERAPY | Facility: HOSPITAL | Age: 61
Setting detail: THERAPIES SERIES
Discharge: HOME | End: 2021-02-18

## 2021-02-18 DIAGNOSIS — Z96.612 STATUS POST REVERSE TOTAL REPLACEMENT OF LEFT SHOULDER: Primary | ICD-10-CM

## 2021-02-18 PROCEDURE — 97110 THERAPEUTIC EXERCISES: CPT

## 2021-02-18 PROCEDURE — 97140 MANUAL THERAPY 1/> REGIONS: CPT

## 2021-02-18 NOTE — THERAPY TREATMENT NOTE
Outpatient Physical Therapy Ortho Treatment Note   [place]     Patient Name: Ashwin Anand  : 1960  MRN: [id number]  Today's Date: 2021      Visit Date: 2021    Visit Dx:    ICD-10-CM ICD-9-CM   1. Status post reverse total replacement of left shoulder  Z96.612 V43.61       Patient Active Problem List   Diagnosis   • Schizophrenia, schizo-affective (CMS/[facility])   • COPD (chronic obstructive pulmonary disease) (CMS/[facility])   • Gastroesophageal reflux disease without esophagitis   • Pain   • Shoulder arthritis        Past Medical History:   Diagnosis Date   • Arthritis    • COPD (chronic obstructive pulmonary disease) (CMS/HCC)    • GERD (gastroesophageal reflux disease)    • Left shoulder pain    • Schizophrenia, schizo-affective (CMS/HCC)     PATIENT DENIES THIS IN HIS MEDICAL HISTORY        Past Surgical History:   Procedure Laterality Date   • ANTERIOR CERVICAL DISCECTOMY W/ FUSION     • APPENDECTOMY     • FINGER SURGERY Right     TENDON AND NERVE REPAIR   • KNEE SURGERY Right     AS A CHILD   • LUMBAR DISCECTOMY     • TOTAL SHOULDER ARTHROPLASTY W/ DISTAL CLAVICLE EXCISION Left 2020    Procedure: TOTAL SHOULDER REVERSE ARTHROPLASTY;  Surgeon: Ashutosh Mehta MD;  Location: [facility];  Service: Orthopedics;  Laterality: Left;       PT Ortho     Row Name 21 0800       Subjective Comments    Subjective Comments  pt states he was a little sore with addition of theraband ex's but it resolved; no further complaints  -       Precautions and Contraindications    Precautions/Limitations  shoulder precautions  -    Precautions  no shoulder extension  -      User Key  (r) = Recorded By, (t) = Taken By, (c) = Cosigned By    Initials Name Provider Type     Bruce Rosales PTA Physical Therapy Assistant                      PT Assessment/Plan     Row Name 21 0848          PT Assessment    Assessment Comments  pt sore following addition of theraband ex's but resolved;  continues to maintain good PROM and improving AROM  -MH        PT Plan    PT Plan Comments  Cont per POC  -MH       User Key  (r) = Recorded By, (t) = Taken By, (c) = Cosigned By    Initials Name Provider Type    Bruce Dillon, PTA Physical Therapy Assistant            OP Exercises     Row Name 02/18/21 0800             Subjective Comments    Subjective Comments  pt states he was a little sore with addition of theraband ex's but it resolved; no further complaints  -MH         Exercise 1    Exercise Name 1  standing: cane flex  -MH      Cueing 1  Verbal  -MH      Reps 1  15  -MH      Time 1  140 degree limit  -MH         Exercise 2    Exercise Name 2  standing: cane ER  -MH      Cueing 2  Verbal  -MH      Reps 2  15  -MH      Time 2  45 degree limit  -MH         Exercise 3    Exercise Name 3  supine: AROM flexion  -MH      Cueing 3  Verbal  -MH      Reps 3  20  -MH      Time 3  1#  -MH         Exercise 4    Exercise Name 4  sitting: AROM ER  -MH      Cueing 4  Verbal;Tactile  -MH      Reps 4  25 - 1#  -MH      Time 4  limited to 45 degrees  -MH         Exercise 5    Exercise Name 5  pulley: flex and scaption (completed within protocol limitations)  -MH      Cueing 5  Verbal  -MH      Time 5  5 min each  -MH         Exercise 6    Exercise Name 6  standing: AROM flexion  -MH      Cueing 6  Verbal;Demo  -MH      Reps 6  20  -MH         Exercise 7    Exercise Name 7  standing: scaption up  -MH      Cueing 7  Verbal;Demo  -MH      Reps 7  20   -MH      Time 7  to shoulder level  -MH         Exercise 8    Exercise Name 8  TB: Rows  -MH      Cueing 8  Verbal;Demo;Tactile  -MH      Reps 8  20  -MH      Time 8  red  -MH         Exercise 9    Exercise Name 9  TB: ER  -MH      Cueing 9  Verbal  -MH      Reps 9  20  -MH      Time 9  yellow  -MH         Exercise 10    Exercise Name 10  TB: IR  -MH      Cueing 10  Verbal;Demo  -MH      Reps 10  20  -MH      Time 10  yellow  -MH        User Key  (r) = Recorded By, (t) = Taken  By, (c) = Cosigned By    Initials Name Provider Type    Bruce Dillon PTA Physical Therapy Assistant                      Manual Rx (last 36 hours)      Manual Treatments     Row Name 02/18/21 0800             Manual Rx 1    Manual Rx 1 Location  Left shoulder PROM with patient supine.  -      Manual Rx 1 Type  PROM for flexion, abd/scaption, IR, & ER  -      Manual Rx 1 Duration  limited to 140 degrees flexion and abd/scaption and ER & IR 45 degrees (in scapular plane) per protocol.   -        User Key  (r) = Recorded By, (t) = Taken By, (c) = Cosigned By    Initials Name Provider Type    Bruce Dillon PTA Physical Therapy Assistant              Therapy Education  Education Details: pt to add scaption to HEP - declined written instruction  Given: HEP, Symptoms/condition management  Program: New, Reinforced  How Provided: Verbal, Demonstration  Provided to: Patient  Level of Understanding: Teach back education performed, Verbalized, Demonstrated              Time Calculation:   Start Time: 0803  Stop Time: 0850  Time Calculation (min): 47 min  Therapy Charges for Today     Code Description Service Date Service Provider Modifiers Qty    55132940245 HC PT MANUAL THERAPY EA 15 MIN 2/18/2021 Bruce Rosales PTA GP 1    69781468270 HC PT THER PROC EA 15 MIN 2/18/2021 Bruce Rosales PTA GP 2                    Bruce Rosales PTA  2/18/2021

## 2021-02-23 ENCOUNTER — HOSPITAL ENCOUNTER (OUTPATIENT)
Dept: PHYSICAL THERAPY | Facility: HOSPITAL | Age: 61
Setting detail: THERAPIES SERIES
Discharge: HOME | End: 2021-02-23

## 2021-02-23 DIAGNOSIS — Z96.612 STATUS POST REVERSE TOTAL REPLACEMENT OF LEFT SHOULDER: Primary | ICD-10-CM

## 2021-02-23 PROCEDURE — 97110 THERAPEUTIC EXERCISES: CPT

## 2021-02-23 PROCEDURE — 97140 MANUAL THERAPY 1/> REGIONS: CPT

## 2021-02-23 NOTE — THERAPY TREATMENT NOTE
Outpatient Physical Therapy Ortho Treatment Note   [place]     Patient Name: Ashwin Anand  : 1960  MRN: [id number]  Today's Date: 2021      Visit Date: 2021    Visit Dx:    ICD-10-CM ICD-9-CM   1. Status post reverse total replacement of left shoulder  Z96.612 V43.61       Patient Active Problem List   Diagnosis   • Schizophrenia, schizo-affective (CMS/HCC)   • COPD (chronic obstructive pulmonary disease) (CMS/HCC)   • Gastroesophageal reflux disease without esophagitis   • Pain   • Shoulder arthritis        Past Medical History:   Diagnosis Date   • Arthritis    • COPD (chronic obstructive pulmonary disease) (CMS/HCC)    • GERD (gastroesophageal reflux disease)    • Left shoulder pain    • Schizophrenia, schizo-affective (CMS/HCC)     PATIENT DENIES THIS IN HIS MEDICAL HISTORY        Past Surgical History:   Procedure Laterality Date   • ANTERIOR CERVICAL DISCECTOMY W/ FUSION     • APPENDECTOMY     • FINGER SURGERY Right     TENDON AND NERVE REPAIR   • KNEE SURGERY Right     AS A CHILD   • LUMBAR DISCECTOMY     • TOTAL SHOULDER ARTHROPLASTY W/ DISTAL CLAVICLE EXCISION Left 2020    Procedure: TOTAL SHOULDER REVERSE ARTHROPLASTY;  Surgeon: Ashutosh Mehta MD;  Location: [facility];  Service: Orthopedics;  Laterality: Left;       PT Ortho     Row Name 21 0800       Subjective Comments    Subjective Comments  pt reports his shoulder has been a little sore because he had to shovel snow/ice  -       Precautions and Contraindications    Precautions/Limitations  shoulder precautions  -    Precautions  no shoulder extension  -      User Key  (r) = Recorded By, (t) = Taken By, (c) = Cosigned By    Initials Name Provider Type    Bruce Dillon, PTA Physical Therapy Assistant                      PT Assessment/Plan     Row Name 21 0901          PT Assessment    Assessment Comments  pt with mild increased soreness reported after having to shovel snow/ice but pt  continues with good PROM all planes and tolerated progression of ex's well  -MH        PT Plan    PT Plan Comments  Cont per POC, progressing per protocol; pt to progress HEP as instructed  -MH       User Key  (r) = Recorded By, (t) = Taken By, (c) = Cosigned By    Initials Name Provider Type    Bruce Dillon, PTA Physical Therapy Assistant            OP Exercises     Row Name 02/23/21 0800             Subjective Comments    Subjective Comments  pt reports his shoulder has been a little sore because he had to shovel snow/ice  -MH         Exercise 1    Exercise Name 1  standing: cane flex  -MH      Cueing 1  Verbal  -MH      Reps 1  15  -MH      Time 1  140 degree limit  -MH         Exercise 2    Exercise Name 2  standing: cane ER  -MH      Cueing 2  Verbal  -MH      Reps 2  15  -MH      Time 2  45 degree limit  -MH         Exercise 3    Exercise Name 3  supine: AROM flexion  -MH      Cueing 3  Verbal  -MH      Reps 3  25  -MH      Time 3  1#  -MH         Exercise 4    Exercise Name 4  sidelying: AROM ER  -MH      Cueing 4  Verbal;Tactile;Demo  -MH      Reps 4  20  -MH      Time 4  1#  -MH         Exercise 5    Exercise Name 5  pulley: flex and scaption (completed within protocol limitations)  -MH      Cueing 5  Verbal  -MH      Time 5  4 min each  -MH         Exercise 6    Exercise Name 6  standing: AROM flexion  -MH      Cueing 6  Verbal;Demo  -MH      Reps 6  25  -MH         Exercise 7    Exercise Name 7  standing: scaption up  -MH      Cueing 7  Verbal;Demo  -MH      Reps 7  25  -MH      Time 7  to shoulder level  -MH         Exercise 8    Exercise Name 8  TB: Rows  -MH      Cueing 8  Verbal;Demo;Tactile  -MH      Reps 8  25  -MH      Time 8  red  -MH         Exercise 9    Exercise Name 9  TB: ER  -MH      Cueing 9  Verbal  -MH      Reps 9  25  -MH      Time 9  yellow  -MH         Exercise 10    Exercise Name 10  TB: IR  -MH      Cueing 10  Verbal  -MH      Reps 10  25  -MH      Time 10  yellow  -MH          Exercise 11    Exercise Name 11  supine: rhythmic stabilization - moving proximal to distal  -      Cueing 11  Verbal;Tactile;Demo  -      Reps 11  3 x 20 osc  -        User Key  (r) = Recorded By, (t) = Taken By, (c) = Cosigned By    Initials Name Provider Type     Bruce Rosales PTA Physical Therapy Assistant                      Manual Rx (last 36 hours)      Manual Treatments     Row Name 02/23/21 0800             Manual Rx 1    Manual Rx 1 Location  Left shoulder PROM with patient supine.  -      Manual Rx 1 Type  PROM for flexion, abd/scaption, IR, & ER  -      Manual Rx 1 Duration  limited to 140 degrees flexion and abd/scaption and ER & IR 45 degrees (in scapular plane) per protocol.   -        User Key  (r) = Recorded By, (t) = Taken By, (c) = Cosigned By    Initials Name Provider Type     Bruce Rosales PTA Physical Therapy Assistant              Therapy Education  Education Details: pt to progress HEP as in clinic - change seated ER to sidelying and progress reps of TB ex's  Given: HEP, Symptoms/condition management  Program: Reinforced, New  How Provided: Verbal, Demonstration  Provided to: Patient  Level of Understanding: Teach back education performed, Verbalized, Demonstrated              Time Calculation:   Start Time: 0755  Stop Time: 0836  Time Calculation (min): 41 min  Therapy Charges for Today     Code Description Service Date Service Provider Modifiers Qty    31243626410 HC PT MANUAL THERAPY EA 15 MIN 2/23/2021 Bruce Rosales PTA GP 1    08451103355 HC PT THER PROC EA 15 MIN 2/23/2021 Bruce Rosales PTA GP 2                    Bruce Rosales PTA  2/23/2021

## 2021-02-25 ENCOUNTER — HOSPITAL ENCOUNTER (OUTPATIENT)
Dept: PHYSICAL THERAPY | Facility: HOSPITAL | Age: 61
Setting detail: THERAPIES SERIES
Discharge: HOME | End: 2021-02-25

## 2021-02-25 DIAGNOSIS — Z96.612 STATUS POST REVERSE TOTAL REPLACEMENT OF LEFT SHOULDER: Primary | ICD-10-CM

## 2021-02-25 PROCEDURE — 97110 THERAPEUTIC EXERCISES: CPT

## 2021-02-25 PROCEDURE — 97140 MANUAL THERAPY 1/> REGIONS: CPT

## 2021-02-25 NOTE — THERAPY TREATMENT NOTE
Outpatient Physical Therapy Ortho Treatment Note   [place]     Patient Name: Ashwin Anand  : 1960  MRN: [id number]  Today's Date: 2021      Visit Date: 2021    Visit Dx:    ICD-10-CM ICD-9-CM   1. Status post reverse total replacement of left shoulder  Z96.612 V43.61       Patient Active Problem List   Diagnosis   • Schizophrenia, schizo-affective (CMS/HCC)   • COPD (chronic obstructive pulmonary disease) (CMS/[facility])   • Gastroesophageal reflux disease without esophagitis   • Pain   • Shoulder arthritis        Past Medical History:   Diagnosis Date   • Arthritis    • COPD (chronic obstructive pulmonary disease) (CMS/HCC)    • GERD (gastroesophageal reflux disease)    • Left shoulder pain    • Schizophrenia, schizo-affective (CMS/HCC)     PATIENT DENIES THIS IN HIS MEDICAL HISTORY        Past Surgical History:   Procedure Laterality Date   • ANTERIOR CERVICAL DISCECTOMY W/ FUSION     • APPENDECTOMY     • FINGER SURGERY Right     TENDON AND NERVE REPAIR   • KNEE SURGERY Right     AS A CHILD   • LUMBAR DISCECTOMY     • TOTAL SHOULDER ARTHROPLASTY W/ DISTAL CLAVICLE EXCISION Left 2020    Procedure: TOTAL SHOULDER REVERSE ARTHROPLASTY;  Surgeon: Ashutosh Mehta MD;  Location: [facility];  Service: Orthopedics;  Laterality: Left;       PT Ortho     Row Name 21 0800       Subjective Comments    Subjective Comments  pt doing well today  -MH       Precautions and Contraindications    Precautions/Limitations  shoulder precautions  -MH    Precautions  no shoulder extension  -MH      User Key  (r) = Recorded By, (t) = Taken By, (c) = Cosigned By    Initials Name Provider Type    Bruce Dillon, PASCUAL Physical Therapy Assistant                      PT Assessment/Plan     Row Name 21 0911          PT Assessment    Assessment Comments  pt continues to progress well with ex's and maintaining good PROM  -MH        PT Plan    PT Plan Comments  Cont per POC  -MH       User Key  (r) =  Recorded By, (t) = Taken By, (c) = Cosigned By    Initials Name Provider Type    ARCADIO Bruce Rosales PTA Physical Therapy Assistant            OP Exercises     Row Name 02/25/21 0800             Subjective Comments    Subjective Comments  pt doing well today  -MH         Exercise 1    Exercise Name 1  standing: cane flex  -MH         Exercise 2    Exercise Name 2  standing: cane ER  -MH         Exercise 3    Exercise Name 3  supine: AROM flexion  -MH      Cueing 3  Verbal  -MH      Reps 3  25  -MH      Time 3  1#  -MH         Exercise 4    Exercise Name 4  sidelying: AROM ER  -MH      Cueing 4  Verbal;Tactile;Demo  -MH      Reps 4  25  -MH      Time 4  1#  -MH         Exercise 5    Exercise Name 5  pulley: flex and scaption (completed within protocol limitations)  -MH      Cueing 5  Verbal  -MH      Time 5  4 min each  -MH         Exercise 6    Exercise Name 6  standing: AROM flexion  -MH      Cueing 6  Verbal;Demo  -MH      Reps 6  25  -MH         Exercise 7    Exercise Name 7  standing: scaption up  -MH      Cueing 7  Verbal;Demo  -MH      Reps 7  25  -MH      Time 7  to shoulder level  -MH         Exercise 8    Exercise Name 8  TB: Rows  -MH      Cueing 8  Verbal;Demo;Tactile  -MH      Reps 8  25  -MH      Time 8  red  -MH         Exercise 9    Exercise Name 9  TB: ER  -MH      Cueing 9  Verbal  -MH      Reps 9  25  -MH      Time 9  yellow  -MH         Exercise 10    Exercise Name 10  TB: IR  -MH      Cueing 10  Verbal  -MH      Reps 10  25  -MH      Time 10  yellow  -MH         Exercise 11    Exercise Name 11  supine: rhythmic stabilization - moving proximal to distal  -MH      Cueing 11  Verbal;Tactile;Demo  -MH      Reps 11  3 x 20 osc  -MH        User Key  (r) = Recorded By, (t) = Taken By, (c) = Cosigned By    Initials Name Provider Type    ARCADIO Bruce Rosales PTA Physical Therapy Assistant                      Manual Rx (last 36 hours)      Manual Treatments     Row Name 02/25/21 0800             Manual Rx  1    Manual Rx 1 Location  Left shoulder PROM with patient supine.  -      Manual Rx 1 Type  PROM for flexion, abd/scaption, IR, & ER  -      Manual Rx 1 Duration  limited to 140 degrees flexion and abd/scaption and ER & IR 45 degrees (in scapular plane) per protocol.   -        User Key  (r) = Recorded By, (t) = Taken By, (c) = Cosigned By    Initials Name Provider Type     Bruce Rosales PTA Physical Therapy Assistant              Therapy Education  Given: HEP  Program: Reinforced  How Provided: Verbal  Provided to: Patient  Level of Understanding: Teach back education performed, Verbalized, Demonstrated              Time Calculation:   Start Time: 0802  Stop Time: 0844  Time Calculation (min): 42 min  Therapy Charges for Today     Code Description Service Date Service Provider Modifiers Qty    10432770974 HC PT MANUAL THERAPY EA 15 MIN 2/25/2021 Bruce Rosales PTA GP 1    97667732357 HC PT THER PROC EA 15 MIN 2/25/2021 Bruce Rosales PTA GP 1                    Bruce Rosales PTA  2/25/2021

## 2021-03-02 ENCOUNTER — HOSPITAL ENCOUNTER (OUTPATIENT)
Dept: PHYSICAL THERAPY | Facility: HOSPITAL | Age: 61
Setting detail: THERAPIES SERIES
Discharge: HOME | End: 2021-03-02

## 2021-03-02 DIAGNOSIS — Z96.612 STATUS POST REVERSE TOTAL REPLACEMENT OF LEFT SHOULDER: Primary | ICD-10-CM

## 2021-03-02 PROCEDURE — 97110 THERAPEUTIC EXERCISES: CPT

## 2021-03-02 PROCEDURE — 97140 MANUAL THERAPY 1/> REGIONS: CPT

## 2021-03-02 NOTE — THERAPY TREATMENT NOTE
Outpatient Physical Therapy Ortho Treatment Note   [place]     Patient Name: Ashwin Anand  : 1960  MRN: [id number]  Today's Date: 3/2/2021      Visit Date: 2021    Visit Dx:    ICD-10-CM ICD-9-CM   1. Status post reverse total replacement of left shoulder  Z96.612 V43.61       Patient Active Problem List   Diagnosis   • Schizophrenia, schizo-affective (CMS/HCC)   • COPD (chronic obstructive pulmonary disease) (CMS/HCC)   • Gastroesophageal reflux disease without esophagitis   • Pain   • Shoulder arthritis        Past Medical History:   Diagnosis Date   • Arthritis    • COPD (chronic obstructive pulmonary disease) (CMS/HCC)    • GERD (gastroesophageal reflux disease)    • Left shoulder pain    • Schizophrenia, schizo-affective (CMS/HCC)     PATIENT DENIES THIS IN HIS MEDICAL HISTORY        Past Surgical History:   Procedure Laterality Date   • ANTERIOR CERVICAL DISCECTOMY W/ FUSION     • APPENDECTOMY     • FINGER SURGERY Right     TENDON AND NERVE REPAIR   • KNEE SURGERY Right     AS A CHILD   • LUMBAR DISCECTOMY     • TOTAL SHOULDER ARTHROPLASTY W/ DISTAL CLAVICLE EXCISION Left 2020    Procedure: TOTAL SHOULDER REVERSE ARTHROPLASTY;  Surgeon: Ashutosh Mehta MD;  Location: [facility];  Service: Orthopedics;  Laterality: Left;       PT Ortho     Row Name 21 0800       Subjective Comments    Subjective Comments  Pt reports he is doing well - minimal limitations throughout his daily routine; pt still being protective of his arm at work  -       Precautions and Contraindications    Precautions/Limitations  shoulder precautions  -    Precautions  no shoulder extension  -      User Key  (r) = Recorded By, (t) = Taken By, (c) = Cosigned By    Initials Name Provider Type    Bruce Dillon PTA Physical Therapy Assistant                      PT Assessment/Plan     Row Name 21 7584          PT Assessment    Assessment Comments  pt tolerated progression of resistance  throughout ex's well and without complaints; pt maintains good PROM all planes; minimal disruption in ADL's at this time  -MH        PT Plan    PT Plan Comments  Cont per POC  -MH       User Key  (r) = Recorded By, (t) = Taken By, (c) = Cosigned By    Initials Name Provider Type    Bruce Dillon, PTA Physical Therapy Assistant            OP Exercises     Row Name 03/02/21 0800             Subjective Comments    Subjective Comments  Pt reports he is doing well - minimal limitations throughout his daily routine; pt still being protective of his arm at work  -MH         Exercise 1    Exercise Name 1  standing: cane flex  -MH         Exercise 2    Exercise Name 2  standing: cane ER  -MH         Exercise 3    Exercise Name 3  supine: AROM flexion  -MH      Cueing 3  Verbal  -MH      Reps 3  30  -MH      Time 3  2#  -MH         Exercise 4    Exercise Name 4  sidelying: AROM ER  -MH      Cueing 4  Verbal;Tactile;Demo  -MH      Reps 4  25  -MH      Time 4  2#  -MH         Exercise 5    Exercise Name 5  pulley: flex and scaption (completed within protocol limitations)  -MH      Cueing 5  Verbal  -MH      Time 5  4 min each  -MH         Exercise 6    Exercise Name 6  standing: AROM flexion  -MH      Cueing 6  Verbal;Demo  -MH      Reps 6  25  -MH      Time 6  1#  -MH         Exercise 7    Exercise Name 7  standing: scaption up  -MH      Cueing 7  Verbal;Demo  -MH      Reps 7  25  -MH      Time 7  1#  -MH         Exercise 8    Exercise Name 8  TB: Rows  -MH      Cueing 8  Verbal;Demo;Tactile  -MH      Reps 8  25  -MH      Time 8  green  -MH         Exercise 9    Exercise Name 9  TB: ER  -MH      Cueing 9  Verbal  -MH      Reps 9  25  -MH      Time 9  red  -MH         Exercise 10    Exercise Name 10  TB: IR  -MH      Cueing 10  Verbal  -MH      Reps 10  25  -MH      Time 10  red  -MH         Exercise 11    Exercise Name 11  supine: rhythmic stabilization - moving proximal to distal  -MH      Cueing 11  Verbal;Tactile;Demo   -      Reps 11  3 x 20 osc  -        User Key  (r) = Recorded By, (t) = Taken By, (c) = Cosigned By    Initials Name Provider Type    Bruce Dillon PTA Physical Therapy Assistant                      Manual Rx (last 36 hours)      Manual Treatments     Row Name 03/02/21 0900             Manual Rx 1    Manual Rx 1 Location  Left shoulder PROM with patient supine.  -      Manual Rx 1 Type  PROM for flexion, abd/scaption, IR, & ER  -      Manual Rx 1 Duration  limited to 140 degrees flexion and abd/scaption and ER & IR 45 degrees (in scapular plane) per protocol.   -        User Key  (r) = Recorded By, (t) = Taken By, (c) = Cosigned By    Initials Name Provider Type    Bruce Dillon PTA Physical Therapy Assistant              Therapy Education  Education Details: green theraband issued for home - pt to advance HEP  Given: HEP  Program: Reinforced, Progressed  How Provided: Verbal  Provided to: Patient  Level of Understanding: Teach back education performed, Verbalized, Demonstrated              Time Calculation:   Start Time: 0800  Stop Time: 0836  Time Calculation (min): 36 min  Therapy Charges for Today     Code Description Service Date Service Provider Modifiers Qty    47481836065 HC PT MANUAL THERAPY EA 15 MIN 3/2/2021 Bruce Rosales PTA GP 1    01562167982 HC PT THER PROC EA 15 MIN 3/2/2021 Bruce Rosales PTA GP 1                    Bruce Rosales PTA  3/2/2021

## 2021-03-04 ENCOUNTER — HOSPITAL ENCOUNTER (OUTPATIENT)
Dept: PHYSICAL THERAPY | Facility: HOSPITAL | Age: 61
Setting detail: THERAPIES SERIES
Discharge: HOME | End: 2021-03-04

## 2021-03-04 DIAGNOSIS — Z96.612 STATUS POST REVERSE TOTAL REPLACEMENT OF LEFT SHOULDER: Primary | ICD-10-CM

## 2021-03-04 PROCEDURE — 97140 MANUAL THERAPY 1/> REGIONS: CPT

## 2021-03-04 PROCEDURE — 97110 THERAPEUTIC EXERCISES: CPT

## 2021-03-04 NOTE — THERAPY TREATMENT NOTE
Outpatient Physical Therapy Ortho Treatment Note   [place]     Patient Name: Ashwin Anand  : 1960  MRN: [id number]  Today's Date: 3/4/2021      Visit Date: 2021    Visit Dx:    ICD-10-CM ICD-9-CM   1. Status post reverse total replacement of left shoulder  Z96.612 V43.61       Patient Active Problem List   Diagnosis   • Schizophrenia, schizo-affective (CMS/[facility])   • COPD (chronic obstructive pulmonary disease) (CMS/[facility])   • Gastroesophageal reflux disease without esophagitis   • Pain   • Shoulder arthritis        Past Medical History:   Diagnosis Date   • Arthritis    • COPD (chronic obstructive pulmonary disease) (CMS/[facility])    • GERD (gastroesophageal reflux disease)    • Left shoulder pain    • Schizophrenia, schizo-affective (CMS/HCC)     PATIENT DENIES THIS IN HIS MEDICAL HISTORY        Past Surgical History:   Procedure Laterality Date   • ANTERIOR CERVICAL DISCECTOMY W/ FUSION     • APPENDECTOMY     • FINGER SURGERY Right     TENDON AND NERVE REPAIR   • KNEE SURGERY Right     AS A CHILD   • LUMBAR DISCECTOMY     • TOTAL SHOULDER ARTHROPLASTY W/ DISTAL CLAVICLE EXCISION Left 2020    Procedure: TOTAL SHOULDER REVERSE ARTHROPLASTY;  Surgeon: Ashutosh Mehta MD;  Location: [facility];  Service: Orthopedics;  Laterality: Left;       PT Ortho     Row Name 21 0800       Subjective Comments    Subjective Comments  pt states he continues to do well - no complaints  -       Precautions and Contraindications    Precautions/Limitations  shoulder precautions  -    Precautions  no shoulder extension  -      User Key  (r) = Recorded By, (t) = Taken By, (c) = Cosigned By    Initials Name Provider Type     Bruce Rosales, PTA Physical Therapy Assistant                      PT Assessment/Plan     Row Name 21 5927          PT Assessment    Assessment Comments  progressing well with strength ex's - needs occasional cues for technique; continues to demonstrate good ROM all  planes with passive stretching  -MH        PT Plan    PT Plan Comments  Cont per POC - to MD next week  -MH       User Key  (r) = Recorded By, (t) = Taken By, (c) = Cosigned By    Initials Name Provider Type    Bruce Dillon, PTA Physical Therapy Assistant            OP Exercises     Row Name 03/04/21 0800             Subjective Comments    Subjective Comments  pt states he continues to do well - no complaints  -MH         Exercise 1    Exercise Name 1  standing: cane flex  -MH         Exercise 2    Exercise Name 2  standing: cane ER  -MH         Exercise 3    Exercise Name 3  supine: AROM flexion  -MH      Cueing 3  Verbal  -MH      Reps 3  30  -MH      Time 3  2#  -MH         Exercise 4    Exercise Name 4  sidelying: AROM ER  -MH      Cueing 4  Verbal;Tactile;Demo  -MH      Reps 4  30  -MH      Time 4  2#  -MH         Exercise 5    Exercise Name 5  pulley: flex and scaption (completed within protocol limitations)  -MH      Cueing 5  Verbal  -MH      Time 5  4 min each  -MH         Exercise 6    Exercise Name 6  standing: AROM flexion  -MH      Cueing 6  Verbal;Demo  -MH      Reps 6  30  -MH      Time 6  2#  -MH         Exercise 7    Exercise Name 7  standing: scaption up  -MH      Cueing 7  Verbal;Demo  -MH      Reps 7  30  -MH      Time 7  2#  -MH         Exercise 8    Exercise Name 8  TB: Rows  -MH      Cueing 8  Verbal;Demo;Tactile  -MH      Reps 8  30  -MH      Time 8  green  -MH         Exercise 9    Exercise Name 9  TB: ER  -MH      Cueing 9  Verbal  -MH      Reps 9  30  -MH      Time 9  green  -MH         Exercise 10    Exercise Name 10  TB: IR  -MH      Cueing 10  Verbal  -MH      Reps 10  30  -MH      Time 10  green  -MH         Exercise 11    Exercise Name 11  supine: rhythmic stabilization - moving proximal to distal  -MH      Cueing 11  Verbal;Tactile;Demo  -MH      Reps 11  3 x 20 osc  -MH        User Key  (r) = Recorded By, (t) = Taken By, (c) = Cosigned By    Initials Name Provider Type    MH  Bruce Rosales PTA Physical Therapy Assistant                      Manual Rx (last 36 hours)      Manual Treatments     Row Name 03/04/21 0800             Manual Rx 1    Manual Rx 1 Location  Left shoulder PROM with patient supine.  -      Manual Rx 1 Type  PROM for flexion, abd/scaption, IR, & ER  -        User Key  (r) = Recorded By, (t) = Taken By, (c) = Cosigned By    Initials Name Provider Type     Bruce Rosales PTA Physical Therapy Assistant              Therapy Education  Given: HEP  Program: Reinforced, Progressed  How Provided: Verbal, Demonstration  Provided to: Patient  Level of Understanding: Teach back education performed, Verbalized, Demonstrated              Time Calculation:   Start Time: 0800  Stop Time: 0838  Time Calculation (min): 38 min  Therapy Charges for Today     Code Description Service Date Service Provider Modifiers Qty    11179663505 HC PT THER PROC EA 15 MIN 3/4/2021 Bruce Rosales PTA GP 1    39688298947 HC PT MANUAL THERAPY EA 15 MIN 3/4/2021 Bruce Rosales PTA GP 1                    Bruce Rosales PTA  3/4/2021

## 2021-03-09 ENCOUNTER — HOSPITAL ENCOUNTER (OUTPATIENT)
Dept: PHYSICAL THERAPY | Facility: HOSPITAL | Age: 61
Setting detail: THERAPIES SERIES
Discharge: HOME | End: 2021-03-09

## 2021-03-09 DIAGNOSIS — Z96.612 STATUS POST REVERSE TOTAL REPLACEMENT OF LEFT SHOULDER: Primary | ICD-10-CM

## 2021-03-09 PROCEDURE — 97140 MANUAL THERAPY 1/> REGIONS: CPT

## 2021-03-09 PROCEDURE — 97110 THERAPEUTIC EXERCISES: CPT

## 2021-03-09 NOTE — THERAPY TREATMENT NOTE
Outpatient Physical Therapy Ortho Treatment Note   [place]     Patient Name: Ashwin Anand  : 1960  MRN: [id number]  Today's Date: 3/9/2021      Visit Date: 2021    Visit Dx:    ICD-10-CM ICD-9-CM   1. Status post reverse total replacement of left shoulder  Z96.612 V43.61       Patient Active Problem List   Diagnosis   • Schizophrenia, schizo-affective (CMS/HCC)   • COPD (chronic obstructive pulmonary disease) (CMS/HCC)   • Gastroesophageal reflux disease without esophagitis   • Pain   • Shoulder arthritis        Past Medical History:   Diagnosis Date   • Arthritis    • COPD (chronic obstructive pulmonary disease) (CMS/HCC)    • GERD (gastroesophageal reflux disease)    • Left shoulder pain    • Schizophrenia, schizo-affective (CMS/HCC)     PATIENT DENIES THIS IN HIS MEDICAL HISTORY        Past Surgical History:   Procedure Laterality Date   • ANTERIOR CERVICAL DISCECTOMY W/ FUSION     • APPENDECTOMY     • FINGER SURGERY Right     TENDON AND NERVE REPAIR   • KNEE SURGERY Right     AS A CHILD   • LUMBAR DISCECTOMY     • TOTAL SHOULDER ARTHROPLASTY W/ DISTAL CLAVICLE EXCISION Left 2020    Procedure: TOTAL SHOULDER REVERSE ARTHROPLASTY;  Surgeon: Ashutosh Mehta MD;  Location: [facility];  Service: Orthopedics;  Laterality: Left;       PT Ortho     Row Name 21 0800       Subjective Comments    Subjective Comments  pt states his shoulder continues to feel good; has been taking his band and cane to work with him   -       Precautions and Contraindications    Precautions/Limitations  shoulder precautions  -    Precautions  no shoulder extension  -       Left Upper Ext    Lt Shoulder Abduction AROM  155 (standing)  -    Lt Shoulder Flexion AROM  153 (standing)  -    Lt Shoulder External Rotation AROM  88 (supine)  -    Lt Shoulder Internal Rotation AROM  70 (supine)  -      User Key  (r) = Recorded By, (t) = Taken By, (c) = Cosigned By    Initials Name Provider Type      Bruce Rosales PTA Physical Therapy Assistant                      PT Assessment/Plan     Row Name 03/09/21 0840          PT Assessment    Assessment Comments  pt progressing well with AROM; good tolerance to progression of theraband this session  -MH        PT Plan    PT Plan Comments  Cont per POC, progressing as tolerated  -MH       User Key  (r) = Recorded By, (t) = Taken By, (c) = Cosigned By    Initials Name Provider Type     Bruce Rosales PTA Physical Therapy Assistant            OP Exercises     Row Name 03/09/21 0800             Subjective Comments    Subjective Comments  pt states his shoulder continues to feel good; has been taking his band and cane to work with him   -MH         Exercise 3    Exercise Name 3  supine: AROM flexion  -MH      Cueing 3  Verbal  -MH      Reps 3  40  -MH      Time 3  2#  -MH         Exercise 4    Exercise Name 4  sidelying: AROM ER  -MH      Cueing 4  Verbal;Tactile;Demo  -MH      Reps 4  40  -MH      Time 4  2#  -MH         Exercise 5    Exercise Name 5  pulley: flex and scaption (completed within protocol limitations)  -MH      Cueing 5  Verbal  -MH      Time 5  4 min each  -MH         Exercise 6    Exercise Name 6  standing: AROM flexion  -MH      Cueing 6  Verbal;Demo  -MH      Reps 6  40  -MH      Time 6  2#  -MH         Exercise 7    Exercise Name 7  standing: scaption up  -MH      Cueing 7  Verbal;Demo  -MH      Reps 7  40  -MH      Time 7  2#  -MH         Exercise 8    Exercise Name 8  TB: Rows  -MH      Cueing 8  Verbal;Demo;Tactile  -MH      Reps 8  35  -MH      Time 8  blue  -MH         Exercise 9    Exercise Name 9  TB: ER  -MH      Cueing 9  Verbal  -MH      Reps 9  35  -MH      Time 9  blue  -MH         Exercise 10    Exercise Name 10  TB: IR  -MH      Cueing 10  Verbal  -MH      Reps 10  35  -MH      Time 10  blue  -MH         Exercise 11    Exercise Name 11  supine: rhythmic stabilization - moving proximal to distal  -MH      Cueing 11   Verbal;Tactile;Demo  -MH      Reps 11  3 x 20 osc  -        User Key  (r) = Recorded By, (t) = Taken By, (c) = Cosigned By    Initials Name Provider Type    Bruce Dillon PTA Physical Therapy Assistant                      Manual Rx (last 36 hours)      Manual Treatments     Row Name 03/09/21 0800             Manual Rx 1    Manual Rx 1 Location  Left shoulder PROM with patient supine.  -      Manual Rx 1 Type  PROM for flexion, abd/scaption, IR, & ER  -      Manual Rx 1 Duration  10 reps each  -        User Key  (r) = Recorded By, (t) = Taken By, (c) = Cosigned By    Initials Name Provider Type    Bruce Dillon PTA Physical Therapy Assistant              Therapy Education  Education Details: pt to progress HEP - blue theraband issued for home  Given: HEP  Program: Progressed, Reinforced  How Provided: Verbal  Provided to: Patient  Level of Understanding: Teach back education performed, Verbalized, Demonstrated              Time Calculation:   Start Time: 0800  Stop Time: 0836  Time Calculation (min): 36 min  Therapy Charges for Today     Code Description Service Date Service Provider Modifiers Qty    20368622557 HC PT MANUAL THERAPY EA 15 MIN 3/9/2021 Bruce Rosales PTA GP 1    77351840977 HC PT THER PROC EA 15 MIN 3/9/2021 Bruce Rosales PTA GP 1                    Bruce Rosales PTA  3/9/2021

## 2021-03-11 ENCOUNTER — HOSPITAL ENCOUNTER (OUTPATIENT)
Dept: PHYSICAL THERAPY | Facility: HOSPITAL | Age: 61
Setting detail: THERAPIES SERIES
Discharge: HOME | End: 2021-03-11

## 2021-03-11 DIAGNOSIS — Z96.612 STATUS POST REVERSE TOTAL REPLACEMENT OF LEFT SHOULDER: Primary | ICD-10-CM

## 2021-03-11 PROCEDURE — 97140 MANUAL THERAPY 1/> REGIONS: CPT

## 2021-03-11 PROCEDURE — 97110 THERAPEUTIC EXERCISES: CPT

## 2021-03-11 NOTE — THERAPY TREATMENT NOTE
Outpatient Physical Therapy Ortho Treatment Note   [facility]     Patient Name: Ashwin Anand  : 1960  MRN: [id number]  Today's Date: 3/11/2021      Visit Date: 2021    Visit Dx:    ICD-10-CM ICD-9-CM   1. Status post reverse total replacement of left shoulder  Z96.612 V43.61       Patient Active Problem List   Diagnosis   • Schizophrenia, schizo-affective (CMS/HCC)   • COPD (chronic obstructive pulmonary disease) (CMS/HCC)   • Gastroesophageal reflux disease without esophagitis   • Pain   • Shoulder arthritis        Past Medical History:   Diagnosis Date   • Arthritis    • COPD (chronic obstructive pulmonary disease) (CMS/HCC)    • GERD (gastroesophageal reflux disease)    • Left shoulder pain    • Schizophrenia, schizo-affective (CMS/HCC)     PATIENT DENIES THIS IN HIS MEDICAL HISTORY        Past Surgical History:   Procedure Laterality Date   • ANTERIOR CERVICAL DISCECTOMY W/ FUSION     • APPENDECTOMY     • FINGER SURGERY Right     TENDON AND NERVE REPAIR   • KNEE SURGERY Right     AS A CHILD   • LUMBAR DISCECTOMY     • TOTAL SHOULDER ARTHROPLASTY W/ DISTAL CLAVICLE EXCISION Left 2020    Procedure: TOTAL SHOULDER REVERSE ARTHROPLASTY;  Surgeon: Ashutosh Mehta MD;  Location: [facility];  Service: Orthopedics;  Laterality: Left;       PT Ortho     Row Name 21 0900       Left Upper Ext    Lt Shoulder Abduction AROM  180 degrees  -LN    Lt Shoulder Abduction PROM  scaption 180 degrees  -LN    Lt Shoulder Extension AROM  NT  -LN    Lt Shoulder Extension PROM  NT  -LN    Lt Shoulder Flexion AROM  175 degrees  -LN    Lt Shoulder Flexion PROM  WFL supine  -LN    Lt Shoulder External Rotation AROM  88 degrees supine  -LN    Lt Shoulder External Rotation PROM  90 degrees supine  -LN    Lt Shoulder Internal Rotation AROM  70 degrees supine  -LN    Lt Shoulder Internal Rotation PROM  75 degrees  -LN    Lt Elbow Extension/Flexion AROM  WNL  -LN    Lt Elbow Extension/Flexion PROM  WNL  -LN  "   Lt Elbow Supination AROM  WNL  -LN    Lt Elbow Supination PROM  WNL  -LN    Lt Elbow Pronation AROM  WNL  -LN    Lt Elbow Pronation PROM  WNL  -LN    Lt Wrist Flexion AROM  WNL  -LN    Lt Wrist Flexion PROM  WNL  -LN    Lt Wrist Extension AROM  WNL  -LN       MMT Left Upper Ext    Lt Shoulder Flexion MMT, Gross Movement  (4+/5) good plus  -LN    Lt Shoulder ABduction MMT, Gross Movement  (4+/5) good plus  -LN    Lt Shoulder Internal Rotation MMT, Gross Movement  (4+/5) good plus  -LN    Lt Shoulder External Rotation MMT, Gross Movement  (4+/5) good plus  -LN    Lt Upper Extremity Comments   painfree with MMT  -LN       Upper Extremity Flexibility    Upper Trapezius  Left:;WNL  -LN    Pect Minor  Left:;WNL  -LN    Pect Major  Left:;WNL  -LN    Row Name 03/11/21 0800       Subjective Comments    Subjective Comments  Patient with no c/o pain except an occasional sharp pain in anterior shoulder but only lasts about \"1/2 second.\"   -LN       Precautions and Contraindications    Precautions/Limitations  shoulder precautions  -LN    Precautions  no shoulder extension  -LN       Subjective Pain    Able to rate subjective pain?  yes  -LN    Pre-Treatment Pain Level  0  -LN      User Key  (r) = Recorded By, (t) = Taken By, (c) = Cosigned By    Initials Name Provider Type    Sunni Randolph, PT Physical Therapist                      PT Assessment/Plan     Row Name 03/11/21 0900          PT Assessment    Assessment Comments  Patient has progressed very well with PT. Left shoulder strength is now good and his shoulder P/AROM is now WFL-WNL in all planes (except still avoiding extension). No c/o left shoulder pain except for occas. sharp pains that go away very quickly. He has good functional use of left UE with ADLs & he is independent with HEP. He has met all goals. He is doing well with work but is still on lifting restrictions. Feel he is ready for discharge with HEP at this time.   -LN        PT Plan    PT " "Plan Comments  Patient returns to MD tomorrow. Recommend discharge with HEP at this time. He has met all goals.   -LN       User Key  (r) = Recorded By, (t) = Taken By, (c) = Cosigned By    Initials Name Provider Type    Sunni Randolph, PT Physical Therapist          Modalities     Row Name 03/11/21 0700             Moist Heat    Patient denies application of MH  Yes  -LN        User Key  (r) = Recorded By, (t) = Taken By, (c) = Cosigned By    Initials Name Provider Type    LN Sunni Oliveira, PT Physical Therapist        OP Exercises     Row Name 03/11/21 0900 03/11/21 0800          Subjective Comments    Subjective Comments  --  Patient with no c/o pain except an occasional sharp pain in anterior shoulder but only lasts about \"1/2 second.\"   -LN        Subjective Pain    Able to rate subjective pain?  --  yes  -LN     Pre-Treatment Pain Level  --  0  -LN        Exercise 3    Exercise Name 3  supine: AROM flexion  -LN  --     Cueing 3  Verbal  -LN  --     Reps 3  45  -LN  --     Time 3  2#  -LN  --        Exercise 4    Exercise Name 4  sidelying: AROM ER  -LN  --     Cueing 4  Verbal;Tactile;Demo  -LN  --     Reps 4  45  -LN  --     Time 4  2#  -LN  --        Exercise 5    Exercise Name 5  pulley: flex and scaption (completed within protocol limitations)  -LN  --     Cueing 5  Verbal  -LN  --     Time 5  4 min each  -LN  --        Exercise 6    Exercise Name 6  standing: AROM flexion  -LN  --     Cueing 6  Verbal;Demo  -LN  --     Reps 6  40  -LN  --     Time 6  2#  -LN  --        Exercise 7    Exercise Name 7  standing: scaption up  -LN  --     Cueing 7  Verbal;Demo  -LN  --     Reps 7  40  -LN  --     Time 7  2#  -LN  --        Exercise 8    Exercise Name 8  TB: Rows  -LN  --     Cueing 8  Verbal;Demo;Tactile  -LN  --     Reps 8  40  -LN  --     Time 8  blue  -LN  --        Exercise 9    Exercise Name 9  TB: ER  -LN  --     Cueing 9  Verbal  -LN  --     Reps 9  40  -LN  --     Time 9  blue  " -LN  --        Exercise 10    Exercise Name 10  TB: IR  -LN  --     Cueing 10  Verbal  -LN  --     Reps 10  40  -LN  --     Time 10  blue  -LN  --        Exercise 11    Exercise Name 11  supine: rhythmic stabilization - moving proximal to distal  -LN  --     Cueing 11  Verbal;Tactile;Demo  -LN  --     Reps 11  3 x 20 osc  -LN  --       User Key  (r) = Recorded By, (t) = Taken By, (c) = Cosigned By    Initials Name Provider Type    LN Sunni Oliveira, PT Physical Therapist                      Manual Rx (last 36 hours)      Manual Treatments     Row Name 03/11/21 0700             Manual Rx 1    Manual Rx 1 Location  Left shoulder PROM with patient supine.  -LN      Manual Rx 1 Type  PROM for flexion, abd/scaption, IR, & ER  -LN      Manual Rx 1 Duration  10 reps each  -LN        User Key  (r) = Recorded By, (t) = Taken By, (c) = Cosigned By    Initials Name Provider Type    Sunni Randolph, PT Physical Therapist          PT OP Goals     Row Name 03/11/21 0900          PT Short Term Goals    STG Date to Achieve  -- all STG met  -LN     STG 1  Patient to verbally report decreased left shoulder pain to <3/10 with exercises and everyday home activities.   -LN     STG 1 Progress  Met  -LN     STG 2  Patient independent with initial HEP (per protocol)  issued by therapist.   -LN     STG 2 Progress  Met  -LN     STG 3  Patient to have improved left shoulder PROM to 120 degrees flexion and abduction/scaption.   -LN     STG 3 Progress  Met  -LN     STG 4  Patient able to perform 10 reps AAROM exercises without any c/o increased left shoulder pain.   -LN     STG 4 Progress  Met  -LN        Long Term Goals    LTG Date to Achieve  -- all LTG met  -LN     LTG 1  Patient independent with more advanced HEP (per protocol) issued by therapist, including submax isometrics.  -LN     LTG 1 Progress  Met  -LN     LTG 2  Patient to have improved left shoulder P/AAROM to 140 degrees flexion and abduction/scaption, & 40  degrees IR & ER.   -LN     LTG 2 Progress  Met  -LN     LTG 3  Patient to verbally report decreased left shoulder pain to 0-1/10 with ADLs and everyday home activities.   -LN     LTG 3 Progress  Met  -LN     LTG 3 Progress Comments  He rated pain at 0/10 today.  -LN     LTG 4  Patient able to begin to use left arm for routine ADLs, including feeding, dressing, and bathing (but no shoulder extension) with left shoulder pain no > than 2/10.   -LN     LTG 4 Progress  Met  -LN     LTG 5  Patient to have improved PROM left shoulder to WFL all planes.  -LN     LTG 5 Progress  Met  -LN     LTG 6  Patient to have improved left shoulder AROM to 140 degrees flexion and scaption (against gravity) to allow for improved functional use of left UE with all ADLs.   -LN     LTG 6 Progress  Met  -LN     LTG 7  Left shoulder strength improved to 4/5 all planes.  -LN     LTG 7 Progress  Met  -LN     LTG 7 Progress Comments  now 4+/5  -LN     LTG 8  Patient able to don and doff a t-shirt without any c/o difficulty or left shoulder pain.  -LN     LTG 8 Progress  Met  -LN     LTG 9  Patient able to put deodorant onto right axilla , using left arm without any difficulty or c/o left shoulder pain.  -LN     LTG 9 Progress  Met  -LN     LTG 9 Progress Comments  He reports able to do this without difficulty but reports a little tightness at end-range.   -LN        Time Calculation    PT Goal Re-Cert Due Date  -- All goals met  -LN       User Key  (r) = Recorded By, (t) = Taken By, (c) = Cosigned By    Initials Name Provider Type    Sunni Randolph, PT Physical Therapist          Therapy Education  Education Details: Patient to continue with HEP ramon with strengthening exercises to help increase endurance of left UE.  Did issue patient black TB to progress to as tolerated at home.  Given: HEP  Program: Reinforced, Progressed  How Provided: Verbal  Provided to: Patient  Level of Understanding: Teach back education performed,  Verbalized, Demonstrated              Time Calculation:   Start Time: 0850  Stop Time: 0930  Time Calculation (min): 40 min  Therapy Charges for Today     Code Description Service Date Service Provider Modifiers Qty    42802519726  PT MANUAL THERAPY EA 15 MIN 3/11/2021 Sunni Oliveira, PT GP 1    [id number]  PT THER PROC EA 15 MIN 3/11/2021 Sunni Oliveira, PT GP 1                    Sunni Oliveira, PT  3/11/2021

## 2021-03-12 ENCOUNTER — OFFICE VISIT (OUTPATIENT)
Dept: ORTHOPEDIC SURGERY | Facility: CLINIC | Age: 61
End: 2021-03-12

## 2021-03-12 VITALS — TEMPERATURE: 97.9 F | HEIGHT: 65 IN | WEIGHT: 145 LBS | BODY MASS INDEX: 24.16 KG/M2

## 2021-03-12 DIAGNOSIS — R52 PAIN: Primary | ICD-10-CM

## 2021-03-12 DIAGNOSIS — Z09 SURGERY FOLLOW-UP: ICD-10-CM

## 2021-03-12 PROCEDURE — 73030 X-RAY EXAM OF SHOULDER: CPT | Performed by: ORTHOPAEDIC SURGERY

## 2021-03-12 PROCEDURE — 99024 POSTOP FOLLOW-UP VISIT: CPT | Performed by: ORTHOPAEDIC SURGERY

## 2021-03-12 NOTE — PROGRESS NOTES
"Ashwin Anand : 1960 MRN: [id number] DATE: 3/12/2021    DIAGNOSIS: 3 month follow up left shoulder arthroplasty      SUBJECTIVE:  Patient returns today for 3 month follow up of left shoulder replacement.  Patient reports doing well with no unusual complaints. Still in PT and reports making good progress.    OBJECTIVE:    Temp 97.9 °F (36.6 °C)   Ht 165.1 cm (65\")   Wt 65.8 kg (145 lb)   BMI 24.13 kg/m²     Review of Systems negative except as above    Exam:  The incision is well healed. No effusion.  Range of motion:  .  ER 50.  IR to L2. The arm is soft and nontender.  Good strength with elevation and abduction but not quite 5/5.  Sensation intact distally.  Brisk cap refill.    DIAGNOSTIC STUDIES    Xrays: AP, scapular Y views of the left shoulder are ordered and reviewed for evaluation of shoulder replacement. They demonstrate a well positioned, well aligned replacement without complicating factors noted.  In comparison with previous films there has been no change.    ASSESSMENT: 3 month follow up left shoulder replacement.    PLAN:   1.  Continue appropriate activity modifications and restrictions as discussed  2.  Continue PT per protocol.  I think he needs 1 more month of strengthening.  3.  I explained that one can expect continued improvement in motion, function, and any residual discomfort for up to 1 year after surgery.  4.  Follow up at 1 year unless experiencing any new or concerning symptoms.    Ashutosh Mehta MD    2021     "

## 2021-03-16 ENCOUNTER — HOSPITAL ENCOUNTER (OUTPATIENT)
Dept: PHYSICAL THERAPY | Facility: HOSPITAL | Age: 61
Setting detail: THERAPIES SERIES
Discharge: HOME | End: 2021-03-16

## 2021-03-16 DIAGNOSIS — Z96.612 STATUS POST REVERSE TOTAL REPLACEMENT OF LEFT SHOULDER: Primary | ICD-10-CM

## 2021-03-16 PROCEDURE — 97110 THERAPEUTIC EXERCISES: CPT

## 2021-03-16 NOTE — THERAPY RE-EVALUATION
"    Outpatient Physical Therapy Ortho Re-Evaluation   Colleen Calabrese     Patient Name: Ashwin Anand  : 1960  MRN: [id number]  Today's Date: 3/16/2021      Visit Date: 2021    Patient Active Problem List   Diagnosis   • Schizophrenia, schizo-affective (CMS/HCC)   • COPD (chronic obstructive pulmonary disease) (CMS/HCC)   • Gastroesophageal reflux disease without esophagitis   • Pain   • Shoulder arthritis        Past Medical History:   Diagnosis Date   • Arthritis    • COPD (chronic obstructive pulmonary disease) (CMS/HCC)    • GERD (gastroesophageal reflux disease)    • Left shoulder pain    • Schizophrenia, schizo-affective (CMS/HCC)     PATIENT DENIES THIS IN HIS MEDICAL HISTORY        Past Surgical History:   Procedure Laterality Date   • ANTERIOR CERVICAL DISCECTOMY W/ FUSION     • APPENDECTOMY     • FINGER SURGERY Right     TENDON AND NERVE REPAIR   • KNEE SURGERY Right     AS A CHILD   • LUMBAR DISCECTOMY     • TOTAL SHOULDER ARTHROPLASTY W/ DISTAL CLAVICLE EXCISION Left 2020    Procedure: TOTAL SHOULDER REVERSE ARTHROPLASTY;  Surgeon: Ashutosh Mehta MD;  Location: [facility];  Service: Orthopedics;  Laterality: Left;       Visit Dx:     ICD-10-CM ICD-9-CM   1. Status post reverse total replacement of left shoulder  Z96.612 V43.61             PT Ortho     Row Name 21 0800       Subjective Comments    Subjective Comments  Patient reports not having any sharp pains anymore; \"I haven't had any since I was here last.\"  \"He said in my line of work , I shouldn't do more than 2 days a week.\"  \"So I may have to apply for disability.\" He reports no issues using left arm with all his daily activities, but he has limited use at work. \"I also want to be able to throw a baseball with my grandson.\" \"I feel like my shoulder is doing really good.\"   -LN       Precautions and Contraindications    Precautions/Limitations  shoulder precautions  -LN    Precautions  no shoulder extension  -LN       " Subjective Pain    Able to rate subjective pain?  yes  -LN    Pre-Treatment Pain Level  0  -LN       Left Upper Ext    Lt Shoulder Abduction AROM  180 degrees   -LN    Lt Shoulder Abduction PROM  scaption 180 degrees  -LN    Lt Shoulder Extension AROM  NT  -LN    Lt Shoulder Extension PROM  NT  -LN    Lt Shoulder Flexion AROM  180 degrees  -LN    Lt Shoulder Flexion PROM  180 degrees  -LN    Lt Shoulder External Rotation AROM  88 degrees supine  -LN    Lt Shoulder External Rotation PROM  90 degrees supine  -LN    Lt Shoulder Internal Rotation AROM  75 degrees supine  -LN    Lt Shoulder Internal Rotation PROM  80 degrees  -LN    Lt Elbow Extension/Flexion AROM  WNL  -LN    Lt Elbow Extension/Flexion PROM  WNL  -LN    Lt Elbow Supination AROM  WNL  -LN    Lt Elbow Supination PROM  WNL  -LN    Lt Elbow Pronation AROM  WNL  -LN    Lt Elbow Pronation PROM  WNL  -LN    Lt Wrist Flexion AROM  WNL  -LN    Lt Wrist Flexion PROM  WNL  -LN    Lt Wrist Extension AROM  WNL  -LN       MMT Left Upper Ext    Lt Shoulder Flexion MMT, Gross Movement  (4+/5) good plus  -LN    Lt Shoulder ABduction MMT, Gross Movement  (4+/5) good plus  -LN    Lt Shoulder Internal Rotation MMT, Gross Movement  (4+/5) good plus  -LN    Lt Shoulder External Rotation MMT, Gross Movement  (4+/5) good plus  -LN    Lt Upper Extremity Comments   painfree with MMT; scaption 4+/5; prone horizontal abduction 4/5; rhomboids 4+/5.   -LN       Upper Extremity Flexibility    Upper Trapezius  Left:;WNL  -LN    Pect Minor  Left:;WNL  -LN    Pect Major  Left:;WNL  -LN      User Key  (r) = Recorded By, (t) = Taken By, (c) = Cosigned By    Initials Name Provider Type    Sunni Randolph, PT Physical Therapist                      Therapy Education  Education Details: Patient to continue with HEP (strengthening exercises) 2 x day as tolerated. Patient to use MH/CP PRN. Advised him to continue to not do any lifting > 5# (per MD protocol).  Given: HEP  Program:  Reinforced, Progressed (added TB B shoulder extension with green TB; prone rows with 3 #; prone horizontal shoulder abduction with no weight.)  How Provided: Verbal, Demonstration, Written  Provided to: Patient  Level of Understanding: Teach back education performed, Verbalized, Demonstrated     PT OP Goals     Row Name 03/16/21 0900          PT Short Term Goals    STG Date to Achieve  04/13/21 4 weeks  -LN     STG 1  Left shoulder flexion, abd/scaption, IR, and ER strength 5/5 when tested.  -LN     STG 1 Progress  New  -LN     STG 2  Left deltoid strength 4+-5/5 when tested.  -LN     STG 2 Progress  New  -LN     STG 3  Left rhomboids strength 5/5 when tested.   -LN     STG 3 Progress  New  -LN     STG 4  Patient independent with new, more advanced HEP, issued by therapist.   -LN     STG 4 Progress  New  -LN     STG 5  Patient able to tolerate increased activity at home and at work with no c/o any increased left shoulder pain.  -LN     STG 5 Progress  New  -LN     STG 6  Patient able to throw baseball underhand with his grandson with no c/o left shoulder pain.  -LN     STG 6 Progress  New  -LN        Time Calculation    PT Goal Re-Cert Due Date  04/13/21  -LN       User Key  (r) = Recorded By, (t) = Taken By, (c) = Cosigned By    Initials Name Provider Type    Sunni Randolph, PT Physical Therapist          PT Assessment/Plan     Row Name 03/16/21 0800          PT Assessment    Assessment Comments  Patient continues with WFL-WNL AROM left shoulder and good functional use of left UE with all ADLs. Good strength left shoulder but is still decreased ramon deltoid muscle. He is still on lifting restrictions at work. Patient will benefit from a few more sessions to progress his strengthening ramon deltoid strengthening.  No c/o any shoulder pain and no episodes of sharp pain in last week.  New goals set secondary to patient has met all goals set at initial evaluation.   -LN        PT Plan    PT Frequency  1x/week   "-LN     Predicted Duration of Therapy Intervention (PT)  4 weeks  -LN     PT Plan Comments  See patient 1 x week x 4 weeks (4 more PT visits), then will discharge with [facility]. MD ordered 1 more month of shoulder strengthening.  Progress with strengthening as tolerated.  New goals set; continue towards all new goals.  -LN       User Key  (r) = Recorded By, (t) = Taken By, (c) = Cosigned By    Initials Name Provider Type    Sunni Randolph, PT Physical Therapist          Modalities     Row Name 03/16/21 0800             Moist Heat    Patient denies application of MH  Yes  -LN         Ice    Patient denies application of Ice  Yes  -LN      Patient reports will apply ice at home to involved area  Yes  -LN        User Key  (r) = Recorded By, (t) = Taken By, (c) = Cosigned By    Initials Name Provider Type    Sunni Randolph, PT Physical Therapist        OP Exercises     Row Name 03/16/21 0800             Subjective Comments    Subjective Comments  Patient reports not having any sharp pains anymore; \"I haven't had any since I was here last.\"  \"He said in my line of work , I shouldn't do more than 2 days a week.\"  \"So I may have to apply for disability.\" He reports no issues using left arm with all his daily activities, but he has limited use at work. \"I also want to be able to throw a baseball with my grandson.\" \"I feel like my shoulder is doing really good.\"   -LN         Subjective Pain    Able to rate subjective pain?  yes  -LN      Pre-Treatment Pain Level  0  -LN         Exercise 3    Exercise Name 3  --  -LN      Cueing 3  --  -LN      Reps 3  --  -LN      Time 3  --  -LN         Exercise 4    Exercise Name 4  sidelying: AROM ER  -LN      Cueing 4  Verbal;Tactile;Demo  -LN      Reps 4  30  -LN      Time 4  3#  -LN         Exercise 5    Exercise Name 5  --  -LN      Cueing 5  --  -LN      Time 5  --  -LN         Exercise 6    Exercise Name 6  standing: AROM flexion  -LN      Cueing 6  Verbal;Demo  " -LN      Reps 6  30  -LN      Time 6  3#  -LN         Exercise 7    Exercise Name 7  standing: scaption up  -LN      Cueing 7  Verbal;Demo  -LN      Reps 7  30  -LN      Time 7  3#  -LN         Exercise 8    Exercise Name 8  TB: Rows  -LN      Cueing 8  Verbal;Demo;Tactile  -LN      Reps 8  40  -LN      Time 8  black  -LN      Additional Comments  HEP  -LN         Exercise 9    Exercise Name 9  TB: ER  -LN      Cueing 9  Verbal  -LN      Reps 9  40  -LN      Time 9  black  -LN      Additional Comments  HEP  -LN         Exercise 10    Exercise Name 10  TB: IR  -LN      Cueing 10  Verbal  -LN      Reps 10  40  -LN      Time 10  black  -LN      Additional Comments  HEP  -LN         Exercise 11    Exercise Name 11  --  -LN      Cueing 11  --  -LN      Reps 11  --  -LN         Exercise 12    Exercise Name 12  TB: extension B  -LN      Cueing 12  Verbal;Tactile;Demo  -LN      Reps 12  30  -LN      Additional Comments  green  -LN         Exercise 13    Exercise Name 13  prone rows  -LN      Cueing 13  Verbal;Tactile;Demo  -LN      Reps 13  30  -LN      Additional Comments  3#  -LN         Exercise 14    Exercise Name 14  prone horizontal abd  -LN      Cueing 14  Verbal;Tactile;Demo  -LN      Reps 14  30  -LN      Additional Comments  no weight  -LN         Exercise 15    Exercise Name 15  ball circles on wall cw & ccw -LN      Cueing 15  Verbal;Tactile;Demo  -LN      Reps 15  25 each  -LN      Additional Comments  small red medicine ball  -LN        User Key  (r) = Recorded By, (t) = Taken By, (c) = Cosigned By    Initials Name Provider Type    LN Sunni Oliveira, PT Physical Therapist           Manual Rx (last 36 hours)      Manual Treatments     Row Name 03/16/21 1100             Manual Rx 1    Manual Rx 1 Location  No PROM needed.  -LN        User Key  (r) = Recorded By, (t) = Taken By, (c) = Cosigned By    Initials Name Provider Type    LN Sunni Oliveira, PT Physical Therapist                                 Time Calculation:     Start Time: 0830  Stop Time: 0900  Time Calculation (min): 30 min     Therapy Charges for Today     Code Description Service Date Service Provider Modifiers Qty    [id number]  PT THER PROC EA 15 MIN 3/16/2021 Sunni Oliveira, PT GP 2                    Sunni Oliveira, PT  3/16/2021

## 2021-03-23 ENCOUNTER — HOSPITAL ENCOUNTER (OUTPATIENT)
Dept: PHYSICAL THERAPY | Facility: HOSPITAL | Age: 61
Setting detail: THERAPIES SERIES
Discharge: HOME | End: 2021-03-23

## 2021-03-23 DIAGNOSIS — Z96.612 STATUS POST REVERSE TOTAL REPLACEMENT OF LEFT SHOULDER: Primary | ICD-10-CM

## 2021-03-23 PROCEDURE — 97110 THERAPEUTIC EXERCISES: CPT

## 2021-03-23 NOTE — THERAPY TREATMENT NOTE
"    Outpatient Physical Therapy Ortho Treatment Note   Colleen Calabrese     Patient Name: Ashwin Anand  : 1960  MRN: [id number]  Today's Date: 3/23/2021      Visit Date: 2021    Visit Dx:    ICD-10-CM ICD-9-CM   1. Status post reverse total replacement of left shoulder  Z96.612 V43.61       Patient Active Problem List   Diagnosis   • Schizophrenia, schizo-affective (CMS/HCC)   • COPD (chronic obstructive pulmonary disease) (CMS/HCC)   • Gastroesophageal reflux disease without esophagitis   • Pain   • Shoulder arthritis        Past Medical History:   Diagnosis Date   • Arthritis    • COPD (chronic obstructive pulmonary disease) (CMS/HCC)    • GERD (gastroesophageal reflux disease)    • Left shoulder pain    • Schizophrenia, schizo-affective (CMS/HCC)     PATIENT DENIES THIS IN HIS MEDICAL HISTORY        Past Surgical History:   Procedure Laterality Date   • ANTERIOR CERVICAL DISCECTOMY W/ FUSION     • APPENDECTOMY     • FINGER SURGERY Right     TENDON AND NERVE REPAIR   • KNEE SURGERY Right     AS A CHILD   • LUMBAR DISCECTOMY     • TOTAL SHOULDER ARTHROPLASTY W/ DISTAL CLAVICLE EXCISION Left 2020    Procedure: TOTAL SHOULDER REVERSE ARTHROPLASTY;  Surgeon: Ashutosh Mehta MD;  Location: [facility];  Service: Orthopedics;  Laterality: Left;       PT Ortho     Row Name 21 0800       Subjective Comments    Subjective Comments  Patient reports that his shoulder is doing good.  He reports no issues with his HEP and no c/o any shoulder pain.  \"I am still on restrictions at work.\"   -LN       Precautions and Contraindications    Precautions/Limitations  shoulder precautions  -LN    Precautions  no shoulder extension  -LN       Subjective Pain    Able to rate subjective pain?  yes  -LN    Pre-Treatment Pain Level  0  -LN      User Key  (r) = Recorded By, (t) = Taken By, (c) = Cosigned By    Initials Name Provider Type    Sunni Randolph, PT Physical Therapist                      PT " "Assessment/Plan     Row Name 03/23/21 0900          PT Assessment    Assessment Comments  Patient continues to progress with strengthening exercises well with increased reps and increased resistance. Left shoulder AROM is WFL-WNL and no c/o any pain in shoulder.  -LN        PT Plan    PT Frequency  1x/week  -LN     Predicted Duration of Therapy Intervention (PT)  2 more visits  -LN     PT Plan Comments  See patient for 2 more visits and then discharge with HEP.   -LN       User Key  (r) = Recorded By, (t) = Taken By, (c) = Cosigned By    Initials Name Provider Type    Sunni Randolph, PT Physical Therapist          Modalities     Row Name 03/23/21 0800             Moist Heat    Patient denies application of MH  Yes  -LN         Ice    Patient denies application of Ice  Yes  -LN      Patient reports will apply ice at home to involved area  Yes  -LN        User Key  (r) = Recorded By, (t) = Taken By, (c) = Cosigned By    Initials Name Provider Type    Sunni Randolph, PT Physical Therapist        OP Exercises     Row Name 03/23/21 0800             Subjective Comments    Subjective Comments  Patient reports that his shoulder is doing good.  He reports no issues with his HEP and no c/o any shoulder pain.  \"I am still on restrictions at work.\"   -LN         Subjective Pain    Able to rate subjective pain?  yes  -LN      Pre-Treatment Pain Level  0  -LN         Exercise 4    Exercise Name 4  sidelying: AROM ER  -LN      Cueing 4  Verbal;Tactile;Demo  -LN      Reps 4  40  -LN      Time 4  3#  -LN         Exercise 6    Exercise Name 6  standing: AROM flexion  -LN      Cueing 6  Verbal;Demo  -LN      Reps 6  40  -LN      Time 6  3#  -LN         Exercise 7    Exercise Name 7  standing: scaption up  -LN      Cueing 7  Verbal;Demo  -LN      Reps 7  40  -LN      Time 7  3#  -LN         Exercise 8    Exercise Name 8  TB: Rows  -LN      Cueing 8  Verbal;Demo;Tactile  -LN      Reps 8  40  -LN      Time 8  " "black  -LN         Exercise 9    Exercise Name 9  TB: ER  -LN      Cueing 9  Verbal  -LN      Reps 9  40  -LN      Time 9  black  -LN         Exercise 10    Exercise Name 10  TB: IR  -LN      Cueing 10  Verbal  -LN      Reps 10  40  -LN      Time 10  black  -LN         Exercise 12    Exercise Name 12  TB: extension B  -LN      Cueing 12  Verbal;Tactile;Demo  -LN      Reps 12  40  -LN      Additional Comments  black  -LN         Exercise 13    Exercise Name 13  prone rows  -LN      Cueing 13  Verbal;Tactile;Demo  -LN      Reps 13  40  -LN      Additional Comments  3#  -LN         Exercise 14    Exercise Name 14  prone horizontal abd (T\"s)  -LN      Cueing 14  Verbal;Tactile;Demo  -LN      Reps 14  40  -LN      Additional Comments  1#  -LN         Exercise 15    Exercise Name 15  ball circles on wall  -LN      Cueing 15  Verbal;Tactile;Demo  -LN      Reps 15  40 each  -LN      Additional Comments  small red medicine ball  -LN         Exercise 16    Exercise Name 16  prone shoulder extension  -LN      Cueing 16  Verbal;Tactile;Demo  -LN      Reps 16  40  -LN      Additional Comments  1#  -LN         Exercise 17    Exercise Name 17  prone Y's  -LN      Cueing 17  Verbal;Tactile;Demo  -LN      Reps 17  30  -LN      Additional Comments  no weight  -LN        User Key  (r) = Recorded By, (t) = Taken By, (c) = Cosigned By    Initials Name Provider Type    Sunni Randolph, PT Physical Therapist                      Manual Rx (last 36 hours)      Manual Treatments     Row Name 03/23/21 0900             Manual Rx 1    Manual Rx 1 Location  No PROM needed.  -LN        User Key  (r) = Recorded By, (t) = Taken By, (c) = Cosigned By    Initials Name Provider Type    Sunni Randolph, PT Physical Therapist              Therapy Education  Education Details: Patient instructed to only use 1 # or soup can with shoulder extension and T's and then progress up to 3# as tolerated.  He is to start without any weight " with Y's and once able to do 40 reps without any issues, he can use a 1 # weight but advised him he may have to decrease reps initially. He is to use MH/CP PRN.  Given: HEP  Program: New, Reinforced, Progressed (added prone horizontal abd (T'S), prone shoulder extension, and prone Y's)  How Provided: Verbal, Demonstration, Written  Provided to: Patient  Level of Understanding: Teach back education performed, Verbalized, Demonstrated              Time Calculation:   Start Time: 0900  Stop Time: 0935  Time Calculation (min): 35 min  Therapy Charges for Today     Code Description Service Date Service Provider Modifiers Qty    09459778648 HC PT THER PROC EA 15 MIN 3/23/2021 Sunni Oliveira, PT GP 2                    Sunni Oliveira, PT  3/23/2021

## 2021-03-25 ENCOUNTER — TRANSCRIBE ORDERS (OUTPATIENT)
Dept: SLEEP MEDICINE | Facility: HOSPITAL | Age: 61
End: 2021-03-25

## 2021-03-25 DIAGNOSIS — Z01.818 OTHER SPECIFIED PRE-OPERATIVE EXAMINATION: Primary | ICD-10-CM

## 2021-03-29 ENCOUNTER — HOSPITAL ENCOUNTER (OUTPATIENT)
Dept: PHYSICAL THERAPY | Facility: HOSPITAL | Age: 61
Setting detail: THERAPIES SERIES
Discharge: HOME | End: 2021-03-29

## 2021-03-29 DIAGNOSIS — Z96.612 STATUS POST REVERSE TOTAL REPLACEMENT OF LEFT SHOULDER: Primary | ICD-10-CM

## 2021-03-29 PROCEDURE — 97110 THERAPEUTIC EXERCISES: CPT

## 2021-03-29 NOTE — THERAPY TREATMENT NOTE
"    Outpatient Physical Therapy Ortho Treatment Note   Colleen Calabrese     Patient Name: Ashwin Anand  : 1960  MRN: [id number]  Today's Date: 3/29/2021      Visit Date: 2021    Visit Dx:    ICD-10-CM ICD-9-CM   1. Status post reverse total replacement of left shoulder  Z96.612 V43.61       Patient Active Problem List   Diagnosis   • Schizophrenia, schizo-affective (CMS/HCC)   • COPD (chronic obstructive pulmonary disease) (CMS/HCC)   • Gastroesophageal reflux disease without esophagitis   • Pain   • Shoulder arthritis        Past Medical History:   Diagnosis Date   • Arthritis    • COPD (chronic obstructive pulmonary disease) (CMS/HCC)    • GERD (gastroesophageal reflux disease)    • Left shoulder pain    • Schizophrenia, schizo-affective (CMS/HCC)     PATIENT DENIES THIS IN HIS MEDICAL HISTORY        Past Surgical History:   Procedure Laterality Date   • ANTERIOR CERVICAL DISCECTOMY W/ FUSION     • APPENDECTOMY     • FINGER SURGERY Right     TENDON AND NERVE REPAIR   • KNEE SURGERY Right     AS A CHILD   • LUMBAR DISCECTOMY     • TOTAL SHOULDER ARTHROPLASTY W/ DISTAL CLAVICLE EXCISION Left 2020    Procedure: TOTAL SHOULDER REVERSE ARTHROPLASTY;  Surgeon: Ashutosh Mehta MD;  Location: [facility];  Service: Orthopedics;  Laterality: Left;       PT Ortho     Row Name 21 0900       Subjective Comments    Subjective Comments  Patient reports that his shoulder is doing good. \"I don't see my doctor again for a year; I need to call him though because I can't remember when he said I could go back to work without restrictions.\"   -LN       Precautions and Contraindications    Precautions/Limitations  shoulder precautions  -LN    Precautions  no shoulder extension  -LN       Subjective Pain    Able to rate subjective pain?  yes  -LN    Pre-Treatment Pain Level  0  -LN      User Key  (r) = Recorded By, (t) = Taken By, (c) = Cosigned By    Initials Name Provider Type    Sunni Randolph, " "PT Physical Therapist                      PT Assessment/Plan     Row Name 03/29/21 0900          PT Assessment    Assessment Comments  Patient with no c/o any shoulder pain and he is progressing well with exercises.   -LN        PT Plan    PT Frequency  1x/week  -LN     Predicted Duration of Therapy Intervention (PT)  1 more visit  -LN     PT Plan Comments  See patient for 1 more visit and then discharge with HEP.  Test shoulder strength next visit.  -LN       User Key  (r) = Recorded By, (t) = Taken By, (c) = Cosigned By    Initials Name Provider Type    Sunni Randolph, PT Physical Therapist          Modalities     Row Name 03/29/21 0900             Ice    Patient denies application of Ice  Yes  -LN      Patient reports will apply ice at home to involved area  Yes  -LN        User Key  (r) = Recorded By, (t) = Taken By, (c) = Cosigned By    Initials Name Provider Type    Sunni Randolph, PT Physical Therapist        OP Exercises     Row Name 03/29/21 0900             Precautions    Existing Precautions/Restrictions  shoulder  -LN         Subjective Comments    Subjective Comments  Patient reports that his shoulder is doing good. \"I don't see my doctor again for a year; I need to call him though because I can't remember when he said I could go back to work without restrictions.\"   -LN         Subjective Pain    Able to rate subjective pain?  yes  -LN      Pre-Treatment Pain Level  0  -LN         Exercise 4    Exercise Name 4  sidelying: AROM ER  -LN      Cueing 4  Verbal;Tactile;Demo  -LN      Reps 4  40  -LN      Time 4  3#  -LN         Exercise 6    Exercise Name 6  standing: AROM flexion  -LN      Cueing 6  Verbal;Demo  -LN      Reps 6  40  -LN      Time 6  3#  -LN         Exercise 7    Exercise Name 7  standing: scaption up  -LN      Cueing 7  Verbal;Demo  -LN      Reps 7  40  -LN      Time 7  3#  -LN         Exercise 8    Exercise Name 8  TB: Rows  -LN      Cueing 8  Verbal;Demo;Tactile  " "-LN      Reps 8  40  -LN      Time 8  black  -LN         Exercise 9    Exercise Name 9  TB: ER  -LN      Cueing 9  Verbal  -LN      Reps 9  40  -LN      Time 9  black  -LN         Exercise 10    Exercise Name 10  TB: IR  -LN      Cueing 10  Verbal  -LN      Reps 10  40  -LN      Time 10  black  -LN         Exercise 12    Exercise Name 12  TB: extension B  -LN      Cueing 12  Verbal;Tactile;Demo  -LN      Reps 12  40  -LN      Additional Comments  black  -LN         Exercise 13    Exercise Name 13  prone rows  -LN      Cueing 13  Verbal;Tactile;Demo  -LN      Reps 13  40  -LN      Additional Comments  3#  -LN         Exercise 14    Exercise Name 14  prone horizontal abd (T\"s)  -LN      Cueing 14  Verbal;Tactile;Demo  -LN      Reps 14  40  -LN      Additional Comments  1#  -LN         Exercise 15    Exercise Name 15  ball circles on wall  -LN      Cueing 15  Verbal;Tactile;Demo  -LN      Reps 15  40 each  -LN      Additional Comments  small red medicine ball  -LN         Exercise 16    Exercise Name 16  prone shoulder extension  -LN      Cueing 16  Verbal;Tactile;Demo  -LN      Reps 16  40  -LN      Additional Comments  1#  -LN         Exercise 17    Exercise Name 17  prone Y's  -LN      Cueing 17  Verbal;Tactile;Demo  -LN      Reps 17  40  -LN      Additional Comments  no weight  -LN         Exercise 18    Exercise Name 18  shoulder flexion vs TB  -LN      Cueing 18  Verbal;Tactile;Demo  -LN      Reps 18  40  -LN      Additional Comments  blue  -LN        User Key  (r) = Recorded By, (t) = Taken By, (c) = Cosigned By    Initials Name Provider Type    LN Sunni Oliveira, PT Physical Therapist                      Manual Rx (last 36 hours)      Manual Treatments     Row Name 03/29/21 1100             Manual Rx 1    Manual Rx 1 Location  No PROM needed.  -LN        User Key  (r) = Recorded By, (t) = Taken By, (c) = Cosigned By    Initials Name Provider Type    LN Sunni Oliveira, PT Physical Therapist    "           Therapy Education  Given: HEP  Program: New, Reinforced, Progressed (added shoulder flexion vs. TB)  How Provided: Verbal, Demonstration, Written  Provided to: Patient  Level of Understanding: Teach back education performed, Verbalized, Demonstrated              Time Calculation:   Start Time: 0900  Stop Time: 0940  Time Calculation (min): 40 min  Therapy Charges for Today     Code Description Service Date Service Provider Modifiers Qty    [id number] HC PT THER PROC EA 15 MIN 3/29/2021 Sunni Oliveira, PT GP 2                    Sunni Oliveira, PT  3/29/2021

## 2021-04-06 ENCOUNTER — HOSPITAL ENCOUNTER (OUTPATIENT)
Dept: PHYSICAL THERAPY | Facility: HOSPITAL | Age: 61
Setting detail: THERAPIES SERIES
Discharge: HOME | End: 2021-04-06

## 2021-04-06 DIAGNOSIS — Z96.612 STATUS POST REVERSE TOTAL REPLACEMENT OF LEFT SHOULDER: Primary | ICD-10-CM

## 2021-04-06 PROCEDURE — 97110 THERAPEUTIC EXERCISES: CPT

## 2021-04-06 NOTE — THERAPY DISCHARGE NOTE
Outpatient Physical Therapy Ortho Treatment Note/Discharge Summary   Colleen Calabrese     Patient Name: Ashwin Anand  : 1960  MRN: [id number]  Today's Date: 2021      Visit Date: 2021    Visit Dx:    ICD-10-CM ICD-9-CM   1. Status post reverse total replacement of left shoulder  Z96.612 V43.61       Patient Active Problem List   Diagnosis   • Schizophrenia, schizo-affective (CMS/HCC)   • COPD (chronic obstructive pulmonary disease) (CMS/HCC)   • Gastroesophageal reflux disease without esophagitis   • Pain   • Shoulder arthritis        Past Medical History:   Diagnosis Date   • Arthritis    • COPD (chronic obstructive pulmonary disease) (CMS/HCC)    • GERD (gastroesophageal reflux disease)    • Left shoulder pain    • Schizophrenia, schizo-affective (CMS/HCC)     PATIENT DENIES THIS IN HIS MEDICAL HISTORY        Past Surgical History:   Procedure Laterality Date   • ANTERIOR CERVICAL DISCECTOMY W/ FUSION     • APPENDECTOMY     • FINGER SURGERY Right     TENDON AND NERVE REPAIR   • KNEE SURGERY Right     AS A CHILD   • LUMBAR DISCECTOMY     • TOTAL SHOULDER ARTHROPLASTY W/ DISTAL CLAVICLE EXCISION Left 2020    Procedure: TOTAL SHOULDER REVERSE ARTHROPLASTY;  Surgeon: Ashutosh Mehta MD;  Location: [facility];  Service: Orthopedics;  Laterality: Left;       PT Ortho     Row Name 21 0900       Precautions and Contraindications    Precautions/Limitations  shoulder precautions  -LN       Subjective Pain    Able to rate subjective pain?  yes  -LN    Pre-Treatment Pain Level  0  -LN       MMT Left Upper Ext    Lt Shoulder Flexion MMT, Gross Movement  (5/5) normal  -LN    Lt Shoulder ABduction MMT, Gross Movement  (5/5) normal  -LN    Lt Shoulder Internal Rotation MMT, Gross Movement  (5/5) normal  -LN    Lt Shoulder External Rotation MMT, Gross Movement  (5/5) normal  -LN    Lt Upper Extremity Comments   scaption up 4+/5; prone horizontal abd 4+/5; rhomboids 5/5  -LN      User Key  (r) =  Recorded By, (t) = Taken By, (c) = Cosigned By    Initials Name Provider Type    Sunni Randolph, PT Physical Therapist                      PT Assessment/Plan     Row Name 04/06/21 0900          PT Assessment    Assessment Comments  Patient has progressed very well with PT; Left shoulder AROM & PROM WFL-WNL all planes and painfree.  Left shoulder strength improved but still with a little weakness in left deltoid, but improved. Feel he is ready to be discharged with HEP at this time but needs to continue strengthening at home. He has met all new goals except unable to assess 6th goal (he hasn't tried this yet).  He is still on work restrictions (he is to call MD about this).   -LN        PT Plan    PT Plan Comments  Discharge with HEP at this time. Patient to continue with HEP/shoulder strengthening at home and to use MH/CP PRN.  Patient to call with any questions or concerns.   -LN       User Key  (r) = Recorded By, (t) = Taken By, (c) = Cosigned By    Initials Name Provider Type    Sunni Randolph, PT Physical Therapist              OP Exercises     Row Name 04/06/21 0900             Precautions    Existing Precautions/Restrictions  shoulder  -LN         Subjective Comments    Subjective Comments  No c/o any shoulder pain but c/o tightness.  He is still on restrictions at work; doesn't return to MD for a year, so he is going to call MD about his restrictions.   -LN         Subjective Pain    Able to rate subjective pain?  yes  -LN      Pre-Treatment Pain Level  0  -LN         Exercise 4    Exercise Name 4  sidelying: AROM ER  -LN      Cueing 4  Verbal;Tactile;Demo  -LN      Reps 4  40  -LN      Time 4  3#  -LN         Exercise 6    Exercise Name 6  standing: AROM flexion  -LN      Cueing 6  Verbal;Demo  -LN      Reps 6  40  -LN      Time 6  3#  -LN         Exercise 7    Exercise Name 7  standing: scaption up  -LN      Cueing 7  Verbal;Demo  -LN      Reps 7  40  -LN      Time 7  3#  -LN       "   Exercise 8    Exercise Name 8  TB: Rows  -LN      Cueing 8  Verbal;Demo;Tactile  -LN      Reps 8  40  -LN      Time 8  black  -LN         Exercise 9    Exercise Name 9  TB: ER  -LN      Cueing 9  Verbal  -LN      Reps 9  40  -LN      Time 9  black  -LN         Exercise 10    Exercise Name 10  TB: IR  -LN      Cueing 10  Verbal  -LN      Reps 10  40  -LN      Time 10  black  -LN         Exercise 12    Exercise Name 12  TB: extension B  -LN      Cueing 12  Verbal;Tactile;Demo  -LN      Reps 12  40  -LN      Additional Comments  black  -LN         Exercise 13    Exercise Name 13  prone rows  -LN      Cueing 13  Verbal;Tactile;Demo  -LN      Reps 13  45  -LN      Additional Comments  3#  -LN         Exercise 14    Exercise Name 14  prone horizontal abd (T\"s)  -LN      Cueing 14  Verbal;Tactile;Demo  -LN      Reps 14  40  -LN      Additional Comments  1#  -LN         Exercise 15    Exercise Name 15  ball circles on wall  -LN      Cueing 15  Verbal;Tactile;Demo  -LN      Reps 15  40 each  -LN      Additional Comments  small red medicine ball  -LN         Exercise 16    Exercise Name 16  prone shoulder extension  -LN      Cueing 16  Verbal;Tactile;Demo  -LN      Reps 16  40  -LN      Additional Comments  1#  -LN         Exercise 17    Exercise Name 17  prone Y's  -LN      Cueing 17  Verbal;Tactile;Demo  -LN      Reps 17  40  -LN      Additional Comments  1#  -LN         Exercise 18    Exercise Name 18  shoulder flexion vs TB  -LN      Cueing 18  Verbal;Tactile;Demo  -LN      Reps 18  40  -LN      Additional Comments  b;ack  -LN         Exercise 19    Exercise Name 19  wall pushup  -LN      Cueing 19  Verbal;Demo  -LN      Reps 19  40  -LN        User Key  (r) = Recorded By, (t) = Taken By, (c) = Cosigned By    Initials Name Provider Type    Sunni Randolph, MATTHEW Physical Therapist                        Manual Rx (last 36 hours)      Manual Treatments     Row Name 04/06/21 0900             Manual Rx 1    Manual " Rx 1 Location  No PROM needed.  -LN        User Key  (r) = Recorded By, (t) = Taken By, (c) = Cosigned By    Initials Name Provider Type    Sunni Randolph, PT Physical Therapist          PT OP Goals     Row Name 04/06/21 0900          PT Short Term Goals    STG Date to Achieve  04/13/21 4 weeks  -LN     STG 1  Left shoulder flexion, abd/scaption, IR, and ER strength 5/5 when tested.  -LN     STG 1 Progress  Met  -LN     STG 2  Left deltoid strength 4+-5/5 when tested.  -LN     STG 2 Progress  Met  -LN     STG 3  Left rhomboids strengthe 5/5 when tested.   -LN     STG 3 Progress  Met  -LN     STG 4  Patient independent with new, more advanced HEP, issued by therapist.   -LN     STG 4 Progress  Met  -LN     STG 5  Patient able to tolerate increased activity at home and at work with no c/o any increased left shoulder pain.  -LN     STG 5 Progress  Met  -LN     STG 6  Patient able to throw baseball underhand with his grandson with no c/o left shoulder pain.  -LN     STG 6 Progress  --  -LN     STG 6 Progress Comments  Patient had not tried this yet, so unable to assess.  -LN       User Key  (r) = Recorded By, (t) = Taken By, (c) = Cosigned By    Initials Name Provider Type    Sunni Randolph, PT Physical Therapist          Therapy Education  Education Details: Patient to continue with strengthening exercises at home 1-2 x day (he has bought weights for home). To use MH/CP PRN.  To call MD regarding his work restrictions.  Given: HEP  Program: New, Reinforced, Progressed (added wall pushups)  How Provided: Verbal, Demonstration, Written  Provided to: Patient  Level of Understanding: Teach back education performed, Verbalized, Demonstrated              Time Calculation:   Start Time: 0910  Stop Time: 0945  Time Calculation (min): 35 min  Therapy Charges for Today     Code Description Service Date Service Provider Modifiers Qty    [id number]  PT THER PROC EA 15 MIN 4/6/2021 Sunni Oliveira  Kitty, PT GP 2                OP PT Discharge Summary  Date of Discharge: 04/06/21  Reason for Discharge: All goals achieved, Independent  Outcomes Achieved: Able to achieve all goals within established timeline  Discharge Destination: Home with home program  Discharge Instructions/Additional Comments: Patient to continue with HEP/shoulder strengthening 1-2 x day as tolerated and use MH/CP PRN. Patient to call with any questions or concerns. Patient to call MD regarding his work restrictions.      Sunni Oliveira, PT  4/6/2021

## 2021-04-07 ENCOUNTER — LAB (OUTPATIENT)
Dept: LAB | Facility: HOSPITAL | Age: 61
End: 2021-04-07

## 2021-04-07 DIAGNOSIS — Z01.818 OTHER SPECIFIED PRE-OPERATIVE EXAMINATION: ICD-10-CM

## 2021-04-07 PROCEDURE — U0004 COV-19 TEST NON-CDC HGH THRU: HCPCS

## 2021-04-07 PROCEDURE — C9803 HOPD COVID-19 SPEC COLLECT: HCPCS

## 2021-04-08 LAB — SARS-COV-2 RNA RESP QL NAA+PROBE: NOT DETECTED

## 2021-04-09 ENCOUNTER — ANESTHESIA EVENT (OUTPATIENT)
Dept: GASTROENTEROLOGY | Facility: HOSPITAL | Age: 61
End: 2021-04-09

## 2021-04-09 ENCOUNTER — ANESTHESIA (OUTPATIENT)
Dept: GASTROENTEROLOGY | Facility: HOSPITAL | Age: 61
End: 2021-04-09

## 2021-04-09 ENCOUNTER — ON CAMPUS - OUTPATIENT (OUTPATIENT)
Dept: URBAN - METROPOLITAN AREA HOSPITAL 114 | Facility: HOSPITAL | Age: 61
End: 2021-04-09
Payer: COMMERCIAL

## 2021-04-09 ENCOUNTER — HOSPITAL ENCOUNTER (OUTPATIENT)
Facility: HOSPITAL | Age: 61
Setting detail: HOSPITAL OUTPATIENT SURGERY
Discharge: HOME | End: 2021-04-09
Attending: INTERNAL MEDICINE | Admitting: INTERNAL MEDICINE

## 2021-04-09 VITALS
SYSTOLIC BLOOD PRESSURE: 131 MMHG | DIASTOLIC BLOOD PRESSURE: 96 MMHG | HEART RATE: 71 BPM | RESPIRATION RATE: 20 BRPM | OXYGEN SATURATION: 99 %

## 2021-04-09 DIAGNOSIS — D12.5 BENIGN NEOPLASM OF SIGMOID COLON: ICD-10-CM

## 2021-04-09 DIAGNOSIS — K64.1 SECOND DEGREE HEMORRHOIDS: ICD-10-CM

## 2021-04-09 DIAGNOSIS — K57.30 DIVERTICULOSIS OF LARGE INTESTINE WITHOUT PERFORATION OR ABS: ICD-10-CM

## 2021-04-09 DIAGNOSIS — Z12.11 ENCOUNTER FOR SCREENING FOR MALIGNANT NEOPLASM OF COLON: ICD-10-CM

## 2021-04-09 DIAGNOSIS — D12.2 BENIGN NEOPLASM OF ASCENDING COLON: ICD-10-CM

## 2021-04-09 DIAGNOSIS — Z12.11 ENCOUNTER FOR SCREENING COLONOSCOPY: ICD-10-CM

## 2021-04-09 PROCEDURE — 88305 TISSUE EXAM BY PATHOLOGIST: CPT | Performed by: INTERNAL MEDICINE

## 2021-04-09 PROCEDURE — 45380 COLONOSCOPY AND BIOPSY: CPT | Performed by: INTERNAL MEDICINE

## 2021-04-09 PROCEDURE — 25010000002 PROPOFOL 10 MG/ML EMULSION: Performed by: ANESTHESIOLOGY

## 2021-04-09 RX ORDER — LIDOCAINE HYDROCHLORIDE 20 MG/ML
INJECTION, SOLUTION INFILTRATION; PERINEURAL AS NEEDED
Status: DISCONTINUED | OUTPATIENT
Start: 2021-04-09 | End: 2021-04-09 | Stop reason: SURG

## 2021-04-09 RX ORDER — PROPOFOL 10 MG/ML
VIAL (ML) INTRAVENOUS CONTINUOUS PRN
Status: DISCONTINUED | OUTPATIENT
Start: 2021-04-09 | End: 2021-04-09 | Stop reason: SURG

## 2021-04-09 RX ORDER — SODIUM CHLORIDE, SODIUM LACTATE, POTASSIUM CHLORIDE, CALCIUM CHLORIDE 600; 310; 30; 20 MG/100ML; MG/100ML; MG/100ML; MG/100ML
30 INJECTION, SOLUTION INTRAVENOUS CONTINUOUS PRN
Status: DISCONTINUED | OUTPATIENT
Start: 2021-04-09 | End: 2021-04-09 | Stop reason: HOSPADM

## 2021-04-09 RX ORDER — SODIUM CHLORIDE 0.9 % (FLUSH) 0.9 %
10 SYRINGE (ML) INJECTION AS NEEDED
Status: DISCONTINUED | OUTPATIENT
Start: 2021-04-09 | End: 2021-04-09 | Stop reason: HOSPADM

## 2021-04-09 RX ORDER — SODIUM CHLORIDE 0.9 % (FLUSH) 0.9 %
3 SYRINGE (ML) INJECTION EVERY 12 HOURS SCHEDULED
Status: DISCONTINUED | OUTPATIENT
Start: 2021-04-09 | End: 2021-04-09 | Stop reason: HOSPADM

## 2021-04-09 RX ADMIN — PROPOFOL 200 MCG/KG/MIN: 10 INJECTION, EMULSION INTRAVENOUS at 11:08

## 2021-04-09 RX ADMIN — SODIUM CHLORIDE, POTASSIUM CHLORIDE, SODIUM LACTATE AND CALCIUM CHLORIDE 30 ML/HR: 600; 310; 30; 20 INJECTION, SOLUTION INTRAVENOUS at 09:57

## 2021-04-09 RX ADMIN — LIDOCAINE HYDROCHLORIDE 60 MG: 20 INJECTION, SOLUTION INFILTRATION; PERINEURAL at 11:08

## 2021-04-09 NOTE — ANESTHESIA POSTPROCEDURE EVALUATION
Patient: Ashwin Anand    Procedure Summary     Date: 04/09/21 [facility] / [facility]    Anesthesia Start: 1105 Anesthesia Stop: 1132    Procedure: COLONOSCOPY to cecum into TI with cold biopsy polypectomy (N/A ) Diagnosis:     Surgeons: Tramaine Ghosh MD Provider: Arpit Clarke MD    Anesthesia Type: MAC ASA Status: 3          Anesthesia Type: MAC    Vitals  Vitals Value Taken Time   /96 04/09/21 1155   Temp     Pulse 71 04/09/21 1155   Resp 20 04/09/21 1155   SpO2 99 % 04/09/21 1155           Post Anesthesia Care and Evaluation      Comments: Pt. Discharged prior to being evaluated by anesthesia.  Chart is reviewed and no complications are noted.  THIS CASE IS NOT MEDICALLY DIRECTED

## 2021-04-09 NOTE — DISCHARGE INSTRUCTIONS
WHAT ARE DIVERTICULOSIS AND DIVERTICULITIS?  Many people have small pouches in their colons that bulge outward through weak spots, like an inner tube that pokes through weak places in a tire.  Each pouch is called a diverticulum.  The condition of having diverticula is DIVERTICULOSIS.  The condition becomes more common as people age.  About half of all people over the age of 60 have diverticulosis    When pouches become infected or inflamed, the condition is called DIVERTICULITIS.  This happens in 10% to 25% of people with diverticulosis.  Diverticulosis and diverticulitis are also called DIVERTICULAR DISEASE.     WHAT ARE THE SYMPTOMS?  Diverticulosis - Most people do not have any discomfort or symptoms.  However, symptoms may include mild cramps, bloating, and constipation.  Other diseases such as irritable bowel syndrome (IBS) and stomach ulcers cause similar problems, so these symptoms do not always mean a person has diverticulosis.  You should visit your doctor if you have these troubling symptoms.    Diverticulitis - The most common symptom is abdominal pain.  The most common sign is tenderness around the left side of the lower abdomen.  If infection is the cause, fever, nausea, vomiting, chills, cramping, and constipation may occur as well.  The severity depends on the extent of the infection and complications.    WHAT ARE THE COMPLICATIONS?  Diverticulitis can lead to bleeding, infections,perforations or tears, or blockages.  These complications always require treatment to prevent them from proggressing and causing serous illness.    Bleeding from a diverticula is a rare complication.  When this occurs, blood may appear in the toilet or in your stool.  Bleeding can be severe, but it may stop by itself and not require treatment.  Doctors believe bleeding diverticula are caused by a small blood vessel in a diverticulum that weakens and finally bursts.  If you have bleeding from the rectum, you should see your  doctor.  If the bleeding does not stop you may need surgery.    Abscess, Perforation, and Peritonitis - The infection causing diverticulitis often clears up after a few days of treatment with antibiotics.  If the condition gets worse, an abscess may form in the colon.  An abscess is an infected area with pus that may cause swelling and destroy tissue.  Sometimes the infected diverticula may develop small holes, called perforations.  These perforations allow pus to leak out of the colon into the abdominal area.  If the abscess is small and remains in the colon, it may clear up after treatment with antibiotics.  If not, the doctor may need to drain it.  A large abscess can become a serious problem if the infection leaks out and contaminates areas outside the colon.  Infection that spreads into the abdominal cavity is called peritonitis.  Peritonitis requires immediate surgery toclean the abdominal cavity and remove the damaged part of the colon.  Without surgery, peritonitis can be fatal.    FISTULA  A fistula is an abnormal connection of tissue between two organs or between an organ and the skin.  When damaged tissues come into contact with each other during infection, they sometimes stick together.  If they heal that way, a fistula forms.  When diverticulitis-related infection spreads through out the colon, the colon's tissue may stick to nearby tissues.  The organs usually involved are the bladder, small intestine, and skin.  The problem can be corrected with surgery to remove the fistula and affected part of the colon.    INTESTINAL OBSTRUCTION  The scarring caused by infection may cause partial or total blockage of the large intestine.  When this happens, the colon is unable to move bowel contents normally.  When the obstruction totally blocks the intestine, emergency surgery is necessary.  Partial blockage is not an emergency, so the surgery to correct it can be planned.    WHAT CAUSES DIVERTICULAR  DISEASE  Although not proven, the dominant theory is that a low-fiber diet is the main cause of diverticular disease.  The disease was first noticed in the [place] in the early 1900s.  At about the same time, processed foods were introduced into the American diet.  Many processed foods contain refined, low-fiber flour.  Unlike whole-wheat flour, refined flour has no wheat bran.    Diverticular disease is common in developed or industrialized countries-particularly the [place], [place], and [place]-where low-fiber diets are common.  The disease is rare in countries of [place] and [place], where people eat high-fiber vegetable diets.    Fiber is the part of fruits, vegetables, and whole grains that the body cannot digest.  Some fiber dissolves easily in water (soluble fiber).  It takes on a soft, jelly-like texture in the intestines.  Some fiber passes almost unchanged through the intestines (insoluble fiber).  Both kinds of fiber help make stools soft and easy to pass.  Fiber also prevents constipation.    Constipation makes the muscles strain to move stool that is too hard.  It is the main cause of increased pressure in the colon.  This excess pressure might cause the weak spots in the colon to bulge out and become diverticula.  Diverticulitis occurs when diverticula become infected or inflamed.  Doctors are not certain what causes the infection.  It may begin when stool or bacteria are caught in the diverticula.  An attack of diverticulitis can develop suddenly and without warning.    HOW DOES THE DOCTOR DIAGNOSE DIVERTICULAR DISEASE  The doctor asks about medical history, does a physical exam, and may perform one or more diagnostic tests.  Because most people do not have symptoms, diverticulosis is often found through tests ordered for another ailment.    When taking a medical history, the doctor may ask about bowel habits, symptoms, pain, diet, and medications.  The physical exam usually involves a  digital rectal exam.  To preform this test. The doctor inserts a gloved, lubricated finger into the rectum to detect tenderness, blockage, or blood.  The doctor may check stool for signs of bleeding and test blood for signs of infection.  The doctor may also order x-rays or other tests.    WHAT IS THE TREATMENT FOR DIVERTICULAR DISEASE  Increasing the amount of fiber in the diet may reduce symptoms of diverticulosis and prevent complications such as diverticulitis.  Fiber keeps stool soft and lowers pressure inside the colon so that bowel contents can move through easily.  The American Dietetic Association. Recommends 20 to 35 grams of fiber each day.  The doctor may also recommend taking a fiber product such as Citrucel or Metamucil once a dya.  These products are mixed water and provide about 2 to 3.5 grams of fiber per  Tablespoon, mixed with 8 ounces of water.    Avoidance of nuts, popcorn, and sunflower, pumpkin, derick, and sesame seeds has been recommended by physicians out of fear that food particles could enter, block, or irritate the diverticula.  However, no scientific data support this treatment measure.  Eating a high-fiber diet is the only requirement highly emphasized across the medical literature.  Eliminating specific foods is not necessary.  The seeds in tomatoes, zucchini, cucumbers, strawberries, and raspberries, as well as poppy seeds, are generally considered harmless.  People differ in amounts and types of foods the can eat.  Decisions about diet should be made based on what works best for each person.  Keeping a food diary may help identify what foods may cause symptoms.    If cramps, bloating, and constipation are problems, the doctor may prescribe  Short course of pain medication.  However, many medications affect emptying of the colon, an undesirable side effect for people with diverticulosis.    DIVERTICULITIS  Treatment focuses on clearing up the infection and inflammation, resting the  colon, and preventing or minimizing complications.  An attack of diverticulitis without complications may respond to antibiotics within a few days if treated early.  To help the colon rest, the doctor may recommend bed rest and a liquid diet, along with a pain reliever.    An acute attack with severe pain or sever infection may require a hospital stay.  Most acute cases of diverticulitis are treated with antibiotics and a liquid diet.  The antibiotics are given by injection into a vein.  In some cases, however, surgery may be necessary.    WHEN IS SURGERY NECESSARY  If attacks are severe or frequent, the doctor may advise surgery.  The surgeon removes the affected part of the colon and joins the remaining sections.  This typed of surgery, called colon resection, aims to keep attacks from coming back and to prevent complications.  The doctor may also recommend surgery for complications of a fistula or intestinal obstruction.    If antibiotics do not correct an attack, emergency surgery may be required.  Other reasons for emergency surgery include a large abscess, perforation, peritonitis, or continued bleeding.    Emergency surgery usually involves 2 operations.  The first will clear the infected abdominal cavity and remove part of the colon.  Because infection and sometimes obstruction, it is not safe to rejoin the colon during the first operation.  Instead, the surgeon creates a temporary hole, or stoma, in the abdomen.  The end of the colon is connected to the hole, a procedure called a colostomy, to allow normal eating and bowel movements.  The stool goes into a bag attached to the opening in the abdomen.  In the second operation, the surgeon rejoins the ends of the colon.

## 2021-04-09 NOTE — ANESTHESIA PREPROCEDURE EVALUATION
Anesthesia Evaluation     NPO Solid Status: > 8 hours  NPO Liquid Status: > 2 hours           Airway   Mallampati: I  TM distance: >3 FB  Neck ROM: full  No difficulty expected  Dental - normal exam     Pulmonary - normal exam   (+) a smoker Former, COPD,   Cardiovascular - normal exam      ROS comment: RBBB    Neuro/Psych  (+) psychiatric history Schizophrenia,     GI/Hepatic/Renal/Endo    (+)  GERD,      Musculoskeletal     Abdominal  - normal exam    Bowel sounds: normal.   Substance History      OB/GYN          Other   arthritis,                    Anesthesia Plan    ASA 3     MAC       Anesthetic plan, all risks, benefits, and alternatives have been provided, discussed and informed consent has been obtained with: patient.

## 2021-04-09 NOTE — H&P
[facility]   HISTORY AND PHYSICAL    Patient Name: Ashwin Anand  : 1960  MRN: [id number]  Primary Care Physician: Sophie Rascon III, NP-C  Date of admission: 2021    Subjective   Subjective     Chief Complaint: screening for colon cancer     History of Present Illness  The patient presents with no gastrointestinal  Symptoms or issues at this time   Review of Systems   All other systems reviewed and are negative.       Personal History     Past Medical History:   Diagnosis Date   • Arthritis    • COPD (chronic obstructive pulmonary disease) (CMS/HCC)    • GERD (gastroesophageal reflux disease)    • Left shoulder pain     HISTORY OF   • Schizophrenia, schizo-affective (CMS/HCC)     PATIENT DENIES THIS IN HIS MEDICAL HISTORY       Past Surgical History:   Procedure Laterality Date   • ANTERIOR CERVICAL DISCECTOMY W/ FUSION     • APPENDECTOMY     • COLONOSCOPY     • FINGER SURGERY Right     TENDON AND NERVE REPAIR   • KNEE SURGERY Right     AS A CHILD   • LUMBAR DISCECTOMY     • TOTAL SHOULDER ARTHROPLASTY W/ DISTAL CLAVICLE EXCISION Left 2020    Procedure: TOTAL SHOULDER REVERSE ARTHROPLASTY;  Surgeon: Ashutosh Mehta MD;  Location: [facility];  Service: Orthopedics;  Laterality: Left;       Family History: family history includes Diabetes in his father and mother; Prostate cancer in his maternal uncle. Otherwise pertinent FHx was reviewed and not pertinent to current issue.    Social History:  reports that he quit smoking about 13 years ago. His smoking use included cigarettes. He has a 20.00 pack-year smoking history. He has never used smokeless tobacco. He reports current alcohol use. He reports current drug use. Drug: Marijuana.    Home Medications:  omeprazole      Allergies:  Allergies   Allergen Reactions   • Morphine Nausea And Vomiting   • Haldol [Haloperidol] Other (See Comments)     UNKNOWN REACTION-PATIENT CAN NOT REMEMBER       Objective    Objective      Vitals:  Heart Rate:  [86] 86  Resp:  [16] 16  BP: (132)/(85) 132/85    Physical Exam  Vitals reviewed.         Result Review    Result Review:  I have personally reviewed the results from the time of this admission to 04/09/21 11:07 AM EDT and agree with these findings:  []  Laboratory  []  Microbiology  []  Radiology  []  EKG/Telemetry   []  Cardiology/Vascular   []  Pathology  []  Old records  []  Other:  Assessment/Plan   Assessment / Plan     Brief Patient Summary:  Ashwin Anand is a 61 y.o. male who presents for screening colonoscopy     Active Hospital Problems:  There are no active hospital problems to display for this patient.      Plan: Colonoscopy risks, alternatives and benefits discussed with patient and the patient is agreeable to having procedure done.  There are no questions and answers to display.       Electronically signed by Tramaine Ghosh MD, 04/09/21, 11:07 AM EDT.

## 2021-04-12 LAB
CYTO UR: NORMAL
LAB AP CASE REPORT: NORMAL
PATH REPORT.FINAL DX SPEC: NORMAL
PATH REPORT.GROSS SPEC: NORMAL

## 2021-06-23 NOTE — ANESTHESIA PROCEDURE NOTES
Airway  Urgency: elective    Date/Time: 12/17/2020 7:01 AM  Airway not difficult    General Information and Staff    Patient location during procedure: OR  CRNA: Lacie Nowak CRNA    Indications and Patient Condition  Indications for airway management: airway protection    Preoxygenated: yes  Mask difficulty assessment: 1 - vent by mask    Final Airway Details  Final airway type: endotracheal airway      Successful airway: ETT  Cuffed: yes   Successful intubation technique: direct laryngoscopy  Endotracheal tube insertion site: oral  Blade: Ijeoma  Blade size: 4  ETT size (mm): 7.5  Cormack-Lehane Classification: grade I - full view of glottis  Placement verified by: chest auscultation and capnometry   Cuff volume (mL): 7  Measured from: lips  ETT/EBT  to lips (cm): 21  Number of attempts at approach: 1  Assessment: lips, teeth, and gum same as pre-op and atraumatic intubation    Additional Comments  Smooth IV induction. Trachea intubated. Cuff up. Dentition intact without injury.             Detail Level: Generalized

## 2022-03-11 ENCOUNTER — OFFICE VISIT (OUTPATIENT)
Dept: ORTHOPEDIC SURGERY | Facility: CLINIC | Age: 62
End: 2022-03-11

## 2022-03-11 VITALS — HEIGHT: 65 IN | WEIGHT: 145 LBS | BODY MASS INDEX: 24.16 KG/M2 | TEMPERATURE: 96.6 F

## 2022-03-11 DIAGNOSIS — Z09 SURGERY FOLLOW-UP: ICD-10-CM

## 2022-03-11 DIAGNOSIS — M25.512 LEFT SHOULDER PAIN, UNSPECIFIED CHRONICITY: Primary | ICD-10-CM

## 2022-03-11 PROCEDURE — 99212 OFFICE O/P EST SF 10 MIN: CPT | Performed by: ORTHOPAEDIC SURGERY

## 2022-03-11 PROCEDURE — 73030 X-RAY EXAM OF SHOULDER: CPT | Performed by: ORTHOPAEDIC SURGERY

## 2022-03-11 NOTE — PROGRESS NOTES
"Ashwin Anand : 1960 MRN: [id number] DATE: 3/11/2022    DIAGNOSIS:  Annual follow up for left shoulder arthroplasty      SUBJECTIVE:  Patient returns today for annual follow up of a left shoulder replacement. Patient reports doing well with no unusual complaints. Denies any limitations due to the shoulder.    OBJECTIVE:    Temp 96.6 °F (35.9 °C)   Ht 165.1 cm (65\")   Wt 65.8 kg (145 lb)   BMI 24.13 kg/m²   Family History   Problem Relation Age of Onset   • Diabetes Mother    • Diabetes Father    • Prostate cancer Maternal Uncle    • Malig Hyperthermia Neg Hx      Past Medical History:   Diagnosis Date   • Arthritis    • COPD (chronic obstructive pulmonary disease) (HCC)    • GERD (gastroesophageal reflux disease)    • Left shoulder pain     HISTORY OF   • Schizophrenia, schizo-affective (HCC)     PATIENT DENIES THIS IN HIS MEDICAL HISTORY     Past Surgical History:   Procedure Laterality Date   • ANTERIOR CERVICAL DISCECTOMY W/ FUSION     • APPENDECTOMY     • COLONOSCOPY     • COLONOSCOPY N/A 2021    Procedure: COLONOSCOPY to cecum into TI with cold biopsy polypectomy;  Surgeon: Tramaine Ghosh MD;  Location: [facility] ENDOSCOPY;  Service: Gastroenterology;  Laterality: N/A;  pre: screening  post: polyp, diverticulosis, hemorrhoids   • FINGER SURGERY Right     TENDON AND NERVE REPAIR   • KNEE SURGERY Right     AS A CHILD   • LUMBAR DISCECTOMY     • TOTAL SHOULDER ARTHROPLASTY W/ DISTAL CLAVICLE EXCISION Left 2020    Procedure: TOTAL SHOULDER REVERSE ARTHROPLASTY;  Surgeon: Ashutosh Mehta MD;  Location: [facility] OR;  Service: Orthopedics;  Laterality: Left;     Social History     Socioeconomic History   • Marital status:    Tobacco Use   • Smoking status: Former Smoker     Packs/day: 1.00     Years: 20.00     Pack years: 20.00     Types: Cigarettes     Quit date:      Years since quittin.2   • Smokeless tobacco: Never Used   • Tobacco comment: 2008    Vaping Use   • Vaping " Use: Never used   Substance and Sexual Activity   • Alcohol use: Yes     Comment: 1-2 BEERS DAY   • Drug use: Yes     Types: Marijuana     Comment: OCCASIONALLY   • Sexual activity: Defer       14 point review of systems is reviewed with the patient.  Pertinent positives are listed above.  All others are negative.    Exam: The incision is well healed.  No effusion.  FE: 165.  ER 55.  IR T10 . The arm is soft and nontender.  Good strength with elevation and abduction. Intact sensation to light touch.  Palpable radial pulse    DIAGNOSTIC STUDIES    Xrays: AP, scapular Y and axillary views of the left shoulder are ordered and reviewed for evaluation of shoulder replacement. The x-rays demonstrate a well positioned, well aligned replacement without complicating factors noted. In comparison with previous films there has been no change.    ASSESSMENT:  Annual follow up for left shoulder replacement.    PLAN:  Appropriate activity modifications and restrictions discussed.  Antibiotic prophylaxis recommendations discussed.  Continue annual follow-up.    Ashutosh Mehta MD    03/11/2022

## 2022-11-02 ENCOUNTER — HOSPITAL ENCOUNTER (EMERGENCY)
Facility: HOSPITAL | Age: 62
Discharge: HOME | End: 2022-11-02
Attending: EMERGENCY MEDICINE | Admitting: EMERGENCY MEDICINE

## 2022-11-02 VITALS
SYSTOLIC BLOOD PRESSURE: 152 MMHG | RESPIRATION RATE: 18 BRPM | TEMPERATURE: 97.7 F | OXYGEN SATURATION: 95 % | HEART RATE: 71 BPM | DIASTOLIC BLOOD PRESSURE: 99 MMHG

## 2022-11-02 DIAGNOSIS — B02.9 HERPES ZOSTER WITHOUT COMPLICATION: Primary | ICD-10-CM

## 2022-11-02 PROCEDURE — 99283 EMERGENCY DEPT VISIT LOW MDM: CPT

## 2022-11-02 RX ORDER — VALACYCLOVIR HYDROCHLORIDE 1 G/1
1000 TABLET, FILM COATED ORAL 3 TIMES DAILY
Qty: 21 TABLET | Refills: 0 | Status: SHIPPED | OUTPATIENT
Start: 2022-11-02

## 2022-11-02 RX ORDER — PREDNISONE 50 MG/1
50 TABLET ORAL DAILY
Qty: 7 TABLET | Refills: 0 | Status: SHIPPED | OUTPATIENT
Start: 2022-11-02

## 2022-11-02 RX ADMIN — FLUORESCEIN SODIUM 1 STRIP: 1 STRIP OPHTHALMIC at 13:20

## 2022-11-02 NOTE — ED PROVIDER NOTES
EMERGENCY DEPARTMENT ENCOUNTER    Room Number:  30/30  Date of encounter:  11/2/2022  PCP: Sophie Rascon III, NP-C  Historian: Patient      I used full protective equipment while examining this patient.  This includes face mask, gloves and protective eyewear.  I washed my hands before entering the room and immediately upon leaving the room      HPI:  Chief Complaint: Facial rash  A complete HPI/ROS/PMH/PSH/SH/FH are unobtainable due to: Nothing    Context: Ashwin Anand is a 62 y.o. male who presents to the ED c/o facial rash for the past 6 days.  Patient states this initially started off as a small bump however has progressed over the past 6 days.  He states there is some moderate pain underneath his right eyebrow.  He describes the rash is burning and itchy.  The rash seems to involve the right side of his forehead and into his eye.  He does state his eye has been matted shut the past several mornings.  He denies any prior medical problems.  He denies any history of shingles.  He was seen at [facility] 2 days ago for suspected facial cellulitis.  He was started on Bactrim without improvement.    Review of Medical Records  Reviewed urgent care office visit from 10/31/2022.  Patient was seen for suspected facial cellulitis.    PAST MEDICAL HISTORY  Active Ambulatory Problems     Diagnosis Date Noted   • Schizophrenia, schizo-affective (HCC)    • COPD (chronic obstructive pulmonary disease) (HCC)    • Gastroesophageal reflux disease without esophagitis 10/29/2020   • Pain 11/16/2020   • Shoulder arthritis 12/04/2020     Resolved Ambulatory Problems     Diagnosis Date Noted   • No Resolved Ambulatory Problems     Past Medical History:   Diagnosis Date   • Arthritis    • GERD (gastroesophageal reflux disease)    • Left shoulder pain          PAST SURGICAL HISTORY  Past Surgical History:   Procedure Laterality Date   • ANTERIOR CERVICAL DISCECTOMY W/ FUSION     • APPENDECTOMY     • COLONOSCOPY    HPI:   Julieta Hobbs is a 78year old female who presents for a MA (Medicare Advantage) Supervisit (Once per calendar year). Patient is here for her Medicare subsequent annual wellness visit.   She states that she has been feeling reasonably well r   • COLONOSCOPY N/A 2021    Procedure: COLONOSCOPY to cecum into TI with cold biopsy polypectomy;  Surgeon: Tramaine Ghosh MD;  Location: [facility] ENDOSCOPY;  Service: Gastroenterology;  Laterality: N/A;  pre: screening  post: polyp, diverticulosis, hemorrhoids   • FINGER SURGERY Right     TENDON AND NERVE REPAIR   • KNEE SURGERY Right     AS A CHILD   • LUMBAR DISCECTOMY     • TOTAL SHOULDER ARTHROPLASTY W/ DISTAL CLAVICLE EXCISION Left 2020    Procedure: TOTAL SHOULDER REVERSE ARTHROPLASTY;  Surgeon: Ashutosh Mehta MD;  Location: [facility] MAIN OR;  Service: Orthopedics;  Laterality: Left;         FAMILY HISTORY  Family History   Problem Relation Age of Onset   • Diabetes Mother    • Diabetes Father    • Prostate cancer Maternal Uncle    • Malig Hyperthermia Neg Hx          SOCIAL HISTORY  Social History     Socioeconomic History   • Marital status:    Tobacco Use   • Smoking status: Former     Packs/day: 1.00     Years: 20.00     Pack years: 20.00     Types: Cigarettes     Quit date:      Years since quittin.8   • Smokeless tobacco: Never   • Tobacco comments:     2008    Vaping Use   • Vaping Use: Never used   Substance and Sexual Activity   • Alcohol use: Yes     Comment: 1-2 BEERS DAY   • Drug use: Yes     Types: Marijuana     Comment: OCCASIONALLY   • Sexual activity: Defer         ALLERGIES  Morphine and Haldol [haloperidol]        REVIEW OF SYSTEMS  All systems reviewed and negative except for those discussed in HPI.       PHYSICAL EXAM    I have reviewed the triage vital signs and nursing notes.    ED Triage Vitals   Temp Heart Rate Resp BP SpO2   22 1124 22 1124 22 1124 22 1246 22 1124   97.7 °F (36.5 °C) 71 18 148/100 95 %      Temp src Heart Rate Source Patient Position BP Location FiO2 (%)   -- -- -- -- --              Physical Exam  GENERAL: Alert, oriented, not distressed  HENT: head atraumatic, no nuchal rigidity  EYES: Mild injection to  (if present), and forms available to patient in AVS       She does NOT have a Power of  for [employer] on file in [place].    Advance care planning including the explanation and discussion of advance directives standard forms performed Face to Face wi Encounters:  01/30/20 : 193 lb (87.5 kg)  01/29/20 : 182 lb (82.6 kg)  01/24/20 : 182 lb (82.6 kg)     Last Cholesterol Labs:   Lab Results   Component Value Date    CHOLEST 202 (H) 04/26/2019    HDL 54 04/26/2019     (H) 04/26/2019    TRIG 131 04/2 the right eye.  No dye uptake, or dendritic lesions seen with fluorescein dye.  CV: regular rhythm, regular rate, no murmur  RESPIRATORY: normal effort, CTA  ABDOMEN: soft, nontender  MUSCULOSKELETAL: no deformity, FROM, no calf swelling or tenderness  NEURO: alert, moves all extremities, follows commands  SKIN: Erythematous, scaly, vesicular rash to the right forehead and right eyebrow.  Rash does not cross midline.  The rash extends back into the hairline.    PROGRESS, DATA ANALYSIS, CONSULTS, AND MEDICAL DECISION MAKING    All labs have been independently reviewed by me.  All radiology studies have been reviewed by me and discussed with radiologist dictating the report.   EKG's independently viewed and interpreted by me.  Discussion below represents my analysis of pertinent findings related to patient's condition, differential diagnosis, treatment plan and final disposition.    I have discussed case with Dr. Erickson, emergency room physician.  He agrees with treatment plan.    ED Course as of 11/02/22 1350   Wed Nov 02, 2022   1259 Patient presents with 6-day history of rash to the right side of his face. Rash consistent with herpes zoster.  His right eye is slightly red and watery.  We will stain to look for ocular involvement.  Will discuss with ophthalmology. [EE]   1322 No dendritic lesions seen on fluorescein exam.    I discussed this with Dr. Meyers, ophthalmology on-call.  She agrees with treatment plan and will make sure the patient is seen next week at the [place] [facility]. [EE]      ED Course User Index  [EE] David Holley PA       AS OF 13:50 EDT VITALS:    BP - 152/99  HR - 71  TEMP - 97.7 °F (36.5 °C)  O2 SATS - 95%        DIAGNOSIS  Final diagnoses:   Herpes zoster without complication         DISPOSITION  Discharged      Dictated utilizing Dragon dictation     Daivd Holley PA  11/02/22 2022     sleep, (TYLENOL PM EXTRA STRENGTH)  MG Oral Tab, Take 1 tablet by mouth daily as needed. Multiple Vitamin (MULTI-VITAMIN DAILY OR), Take 1 tablet by mouth daily. Probiotic Product (PROBIOTIC OR), Take 1 tablet by mouth daily.          MEDICAL IN Weight as of this encounter: 193 lb (87.5 kg).     Medicare Hearing Assessment  (Required for AWV/SWV)    Finger Rub       Visual Acuity                           General appearance: alert, appears stated age and cooperative  Head: Normocephalic, without ob pregabalin 75 mg 2 capsules twice daily  Continue duloxetine 30 mg 2 capsules daily    9. Hyperuricemia  Continue allopurinol 100 mg 1 tablet  Continue colchicine 0.6 mg 1 tablet daily as needed    10.  Dyslipidemia  Continue rosuvastatin 5 mg 1 tablet brandi Physical Exam only, or if medically necessary Electrocardiogram date05/10/2018       Colorectal Cancer Screening      Colonoscopy Screen every 10 years Colonoscopy due on 06/23/1999 Update Health Maintenance if applicable    Flex Sigmoidoscopy Screen every history found This may be covered with your prescription benefits, but Medicare does not cover unless Medically needed    Zoster  Not covered by Medicare Part B No vaccine history found This may be covered with your pharmacy  prescription benefits      SPE

## 2022-11-02 NOTE — ED TRIAGE NOTES
PAtient to Er via car from home for facial swelling above Right eye. Patient states that he thinks he got a bite of some sort on Friday and swelling started on Sunday

## (undated) DEVICE — PK SHLDR OPN 40

## (undated) DEVICE — APPL CHLORAPREP HI/LITE 26ML ORNG

## (undated) DEVICE — SCRW FIX LK HEX 4.75X30MM
Type: IMPLANTABLE DEVICE | Site: SHOULDER | Status: NON-FUNCTIONAL
Removed: 2020-12-17

## (undated) DEVICE — HANDPIECE SET WITH COAXIAL HIGH FLOW TIP AND SUCTION TUBE: Brand: INTERPULSE

## (undated) DEVICE — GLV SURG SIGNATURE ESSENTIAL PF LTX SZ8.5

## (undated) DEVICE — PENCL E/S ULTRAVAC TELESCP NOSE HOLSTR 10FT

## (undated) DEVICE — DUAL CUT SAGITTAL BLADE

## (undated) DEVICE — SOL ISO/ALC RUB 70PCT 4OZ

## (undated) DEVICE — SKIN PREP TRAY W/CHG: Brand: MEDLINE INDUSTRIES, INC.

## (undated) DEVICE — PIN STNMAN 3.2MM 9IN
Type: IMPLANTABLE DEVICE | Site: SHOULDER | Status: NON-FUNCTIONAL
Removed: 2020-12-17

## (undated) DEVICE — KT ORCA ORCAPOD DISP STRL

## (undated) DEVICE — TRAP FLD MINIVAC MEGADYNE 100ML

## (undated) DEVICE — SUT SILK 2/0 TIES 18IN A185H

## (undated) DEVICE — SENSR O2 OXIMAX FNGR A/ 18IN NONSTR

## (undated) DEVICE — DRSNG SURESITE WNDW 4X4.5

## (undated) DEVICE — POOLE SUCTION HANDLE: Brand: CARDINAL HEALTH

## (undated) DEVICE — GLV SURG BIOGEL LTX PF 8 1/2

## (undated) DEVICE — ADHS SKIN DERMABOND TOP ADVANCED

## (undated) DEVICE — SINGLE-USE BIOPSY FORCEPS: Brand: RADIAL JAW 4

## (undated) DEVICE — SUT VIC 2/0 CT2 27IN J269H

## (undated) DEVICE — THE TORRENT IRRIGATION SCOPE CONNECTOR IS USED WITH THE TORRENT IRRIGATION TUBING TO PROVIDE IRRIGATION FLUIDS SUCH AS STERILE WATER DURING GASTROINTESTINAL ENDOSCOPIC PROCEDURES WHEN USED IN CONJUNCTION WITH AN IRRIGATION PUMP (OR ELECTROSURGICAL UNIT).: Brand: TORRENT

## (undated) DEVICE — 3M™ IOBAN™ 2 ANTIMICROBIAL INCISE DRAPE 6640EZ: Brand: IOBAN™ 2

## (undated) DEVICE — TUBING, SUCTION, 1/4" X 10', STRAIGHT: Brand: MEDLINE

## (undated) DEVICE — ARM SLING: Brand: DEROYAL

## (undated) DEVICE — DRSNG TELFA PAD NONADH STR 1S 3X8IN

## (undated) DEVICE — LN SMPL CO2 SHTRM SD STREAM W/M LUER

## (undated) DEVICE — PREP SOL POVIDONE/IODINE BT 4OZ

## (undated) DEVICE — CANN O2 ETCO2 FITS ALL CONN CO2 SMPL A/ 7IN DISP LF

## (undated) DEVICE — ADAPT CLN BIOGUARD AIR/H2O DISP

## (undated) DEVICE — MAT FLR ABSORBENT LG 4FT 10 2.5FT

## (undated) DEVICE — DRAPE,U/ SHT,SPLIT,PLAS,STERIL: Brand: MEDLINE